# Patient Record
Sex: FEMALE | Race: WHITE | NOT HISPANIC OR LATINO | Employment: FULL TIME | URBAN - METROPOLITAN AREA
[De-identification: names, ages, dates, MRNs, and addresses within clinical notes are randomized per-mention and may not be internally consistent; named-entity substitution may affect disease eponyms.]

---

## 2018-03-12 ENCOUNTER — OFFICE VISIT (OUTPATIENT)
Dept: OBGYN CLINIC | Facility: MEDICAL CENTER | Age: 38
End: 2018-03-12
Payer: COMMERCIAL

## 2018-03-12 VITALS
HEIGHT: 63 IN | SYSTOLIC BLOOD PRESSURE: 122 MMHG | DIASTOLIC BLOOD PRESSURE: 76 MMHG | WEIGHT: 245 LBS | BODY MASS INDEX: 43.41 KG/M2

## 2018-03-12 DIAGNOSIS — N92.0 MENORRHAGIA WITH REGULAR CYCLE: ICD-10-CM

## 2018-03-12 DIAGNOSIS — Z01.419 ENCOUNTER FOR GYNECOLOGICAL EXAMINATION: Primary | ICD-10-CM

## 2018-03-12 PROCEDURE — S0610 ANNUAL GYNECOLOGICAL EXAMINA: HCPCS | Performed by: OBSTETRICS & GYNECOLOGY

## 2018-03-12 RX ORDER — ALBUTEROL SULFATE 0.63 MG/3ML
0.63 SOLUTION RESPIRATORY (INHALATION) 3 TIMES DAILY
COMMUNITY
Start: 2016-04-04

## 2018-03-12 RX ORDER — ACETAMINOPHEN 325 MG/1
TABLET ORAL
COMMUNITY

## 2018-03-12 RX ORDER — BUTALBITAL, ACETAMINOPHEN AND CAFFEINE 300; 40; 50 MG/1; MG/1; MG/1
1 CAPSULE ORAL 3 TIMES DAILY
COMMUNITY
Start: 2018-02-21

## 2018-03-12 RX ORDER — LANOLIN ALCOHOL/MO/W.PET/CERES
3 CREAM (GRAM) TOPICAL
COMMUNITY

## 2018-03-12 RX ORDER — EPINEPHRINE 0.3 MG/.3ML
0.3 INJECTION SUBCUTANEOUS
COMMUNITY

## 2018-03-12 RX ORDER — CYCLOBENZAPRINE HCL 10 MG
TABLET ORAL
Refills: 1 | COMMUNITY
Start: 2018-02-21

## 2018-03-12 RX ORDER — LISINOPRIL 40 MG/1
40 TABLET ORAL
COMMUNITY
Start: 2018-03-12 | End: 2019-03-12

## 2018-03-12 RX ORDER — ALBUTEROL SULFATE 90 UG/1
2 AEROSOL, METERED RESPIRATORY (INHALATION) EVERY 6 HOURS
COMMUNITY
Start: 2016-04-04

## 2018-03-12 RX ORDER — ALPRAZOLAM 0.25 MG/1
0.25 TABLET ORAL 3 TIMES DAILY
COMMUNITY
Start: 2017-11-20

## 2018-03-12 RX ORDER — HYDROCODONE BITARTRATE AND ACETAMINOPHEN 5; 300 MG/1; MG/1
1 TABLET ORAL EVERY 8 HOURS
COMMUNITY
Start: 2018-02-22 | End: 2018-03-24

## 2018-03-12 RX ORDER — OMEPRAZOLE 40 MG/1
CAPSULE, DELAYED RELEASE ORAL
COMMUNITY
Start: 2016-06-20

## 2018-03-12 NOTE — PROGRESS NOTES
ASSESSMENT & PLAN: Venkat Burroughs is a 45 y o  No obstetric history on file  with normal gynecologic exam     1   Routine well woman exam done today  2  Pap and HPV:  The patient's last pap was normal HPV neg  Pap and cotesting was not done today  Current AS[facility]P Guidelines reviewed  3   The following were reviewed in today's visit: breast self exam, exercise and healthy diet  4  Menorrhagia with very regular cycles : we discussed risks and benefits of LARC for management / interested in Mirena     CC:  Annual Gynecologic Examination    HPI: Venkat Burroughs is a 45 y o  No obstetric history on file  who presents for annual gynecologic examination    She has the following concerns:  Heavy regular cycles , has done research on Mirena and desires to have one placed      Health Maintenance:      Past Medical History:   Diagnosis Date    Hip dysplasia     last assessed: 9/15/15    Insulin controlled gestational diabetes mellitus in third trimester     last assessed: 9/23/15       Past Surgical History:   Procedure Laterality Date    CARPAL TUNNEL RELEASE Bilateral     LAPAROSCOPIC CHOLECYSTECTOMY      LAILA-EN-Y PROCEDURE      TOOTH EXTRACTION      TOTAL HIP ARTHROPLASTY Left     TRIGGER FINGER RELEASE Right        Past OB/Gyn History:  OB History     Obstetric Comments    History of insulin controlled Diabetes Mellitus in third trimester          Family History   Problem Relation Age of Onset    Aortic stenosis Mother     Diabetes Mother     Hypertension Mother     Hyperlipidemia Mother     Diabetes Father     Hypertension Father     Hyperlipidemia Father     Heart murmur Brother     Hypertension Brother        Social History:  Social History     Social History    Marital status: /Civil Union     Spouse name: N/A    Number of children: N/A    Years of education: N/A     Occupational History    Unemployed      Social History Main Topics    Smoking status: Never Smoker    Smokeless tobacco: Never Used    Alcohol use No    Drug use: No    Sexual activity: Yes     Partners: Male     Birth control/ protection: Male Sterilization     Other Topics Concern    Not on file     Social History Narrative    No narrative on file         Allergies   Allergen Reactions    Other     Montelukast Anxiety     anxiety  anxiety         Current Outpatient Prescriptions:     albuterol (ACCUNEB) 0 63 MG/3ML nebulizer solution, Inhale 0 63 mg Three times a day, Disp: , Rfl:     albuterol (PROVENTIL HFA,VENTOLIN HFA) 90 mcg/act inhaler, Inhale 2 puffs every 6 (six) hours, Disp: , Rfl:     ALPRAZolam (XANAX) 0 25 mg tablet, Take 0 25 mg by mouth Three times a day, Disp: , Rfl:     Butalbital-APAP-Caffeine -40 MG CAPS, Take 1 capsule by mouth Three times a day, Disp: , Rfl:     HYDROcodone-acetaminophen (XODOL) 5-300 MG per tablet, Take 1 tablet by mouth every 8 (eight) hours, Disp: , Rfl:     ipratropium (ATROVENT) 0 02 % nebulizer solution, , Disp: , Rfl:     lisinopril (ZESTRIL) 40 mg tablet, Take 40 mg by mouth, Disp: , Rfl:     omeprazole (PriLOSEC) 40 MG capsule, TAKE ONE CAPSULE BY MOUTH IN THE MORNING, Disp: , Rfl:     acetaminophen (TYLENOL) 325 mg tablet, Take by mouth, Disp: , Rfl:     Cholecalciferol (VITAMIN D3) 5000 units CHEW, Chew 5,000 Units, Disp: , Rfl:     cyclobenzaprine (FLEXERIL) 10 mg tablet, TAKE 1 TABLET BY MOUTH 3 TIMES A DAY AS NEEDED FOR MUSCLE SPASMS, Disp: , Rfl: 1    diphenhydrAMINE (BENADRYL) 50 MG tablet, Take 50 mg by mouth every 6 (six) hours, Disp: , Rfl:     EPINEPHrine (EPIPEN) 0 3 mg/0 3 mL SOAJ, Inject 0 3 mg into the shoulder, thigh, or buttocks, Disp: , Rfl:     melatonin 3 mg, Take 3 mg by mouth, Disp: , Rfl:     mepolizumab (NUCALA) 100 mg, Inject 100 mg under the skin, Disp: , Rfl:     Mometasone Furo-Formoterol Fum (DULERA) 100-5 MCG/ACT AERO, Inhale, Disp: , Rfl:       Review of Systems  Constitutional :no fever, feels well, no tiredness, no recent weight gain or loss  ENT: no ear ache, no loss of hearing, no nosebleeds or nasal discharge, no sore throat or hoarseness  Cardiovascular: no complaints of slow or fast heart beat, no chest pain, no palpitations, no leg claudication or lower extremity edema  Respiratory: no complaints of shortness of shortness of breath, no JENNINGS  Breasts:no complaints of breast pain, breast lump, or nipple discharge  Gastrointestinal: no complaints of abdominal pain, constipation,nausea, vomiting, or diarrhea or bloody stools  Genitourinary : as noted in HPI  Integumentary: no complaints of skin rash or lesion, itching or dry skin  Neurological: no complaints of headache, no confusion, no numbness or tingling, no dizziness or fainting    Objective      /76   Ht 5' 2 5" (1 588 m)   Wt 111 kg (245 lb)   LMP 02/19/2018   Breastfeeding? No   BMI 44 10 kg/m²   General:   appears stated age, cooperative, alert normal mood and affect   Neck: Neck: normal, supple,trachea midline, no masses   Heart: regular rate and rhythm, S1, S2 normal, no murmur, click, rub or gallop   Lungs: clear to auscultation bilaterally   Breasts: normal appearance, no masses or tenderness, No axillary or supraclavicular adenopathy   Abdomen: soft, non-tender, without masses or organomegaly   Vulva: normal   Vagina: normal vagina, no discharge, exudate, lesion, or erythema   Urethra: normal   Cervix: Normal, no discharge  Nontender     Uterus: normal size, contour, position, consistency, mobility, non-tender   Adnexa: no mass, fullness, tenderness

## 2018-03-14 ENCOUNTER — TELEPHONE (OUTPATIENT)
Dept: OBGYN CLINIC | Facility: MEDICAL CENTER | Age: 38
End: 2018-03-14

## 2018-03-14 NOTE — TELEPHONE ENCOUNTER
----- Message from Cole Sidhu sent at 3/12/2018  4:38 PM EDT -----  Regarding: Chu Cruz  Please precert Mirena for pt and schedule insertion with [address]  Thanks!

## 2018-03-28 ENCOUNTER — TELEPHONE (OUTPATIENT)
Dept: OBGYN CLINIC | Facility: MEDICAL CENTER | Age: 38
End: 2018-03-28

## 2018-03-28 NOTE — TELEPHONE ENCOUNTER
I tried to call pt, but # on file is not in service  Mirena is covered at 100% for pt  Letter sent to pt to contact us  Please schedule IUD insertion when she calls back

## 2018-04-26 ENCOUNTER — OFFICE VISIT (OUTPATIENT)
Dept: OBGYN CLINIC | Facility: MEDICAL CENTER | Age: 38
End: 2018-04-26

## 2018-04-26 VITALS — DIASTOLIC BLOOD PRESSURE: 84 MMHG | BODY MASS INDEX: 44.82 KG/M2 | SYSTOLIC BLOOD PRESSURE: 126 MMHG | WEIGHT: 249 LBS

## 2018-04-26 DIAGNOSIS — Z30.430 ENCOUNTER FOR INSERTION OF INTRAUTERINE CONTRACEPTIVE DEVICE (IUD): Primary | ICD-10-CM

## 2018-04-26 RX ORDER — MISOPROSTOL 200 UG/1
200 TABLET ORAL DAILY
Qty: 2 TABLET | Refills: 0 | Status: SHIPPED | OUTPATIENT
Start: 2018-04-26

## 2018-04-26 NOTE — PROGRESS NOTES
Iud insertions  Date/Time: 4/26/2018 1:05 PM  Performed by: Nick Odom  Authorized by: Nick Odom     Consent:     Consent obtained:  Written    Consent given by:  Patient    Procedure risks and benefits discussed: yes      Patient questions answered: yes      Patient agrees, verbalizes understanding, and wants to proceed: yes      Educational handouts given: yes      Instructions and paperwork completed: yes    Procedure:     Pelvic exam performed: yes      Negative urine pregnancy test: yes      Cervix cleaned and prepped: yes      Speculum placed in vagina: yes      Tenaculum applied to cervix: yes      Uterus sounded: no      Could not pass sound  Cervix dilated  Procedure terminated due to inability to sound uterus  Will have patient follow up for second attempt after taking cytotec  Verbalized understanding

## 2018-05-01 ENCOUNTER — PROCEDURE VISIT (OUTPATIENT)
Dept: OBGYN CLINIC | Facility: MEDICAL CENTER | Age: 38
End: 2018-05-01
Payer: COMMERCIAL

## 2018-05-01 VITALS
SYSTOLIC BLOOD PRESSURE: 144 MMHG | DIASTOLIC BLOOD PRESSURE: 78 MMHG | BODY MASS INDEX: 44.14 KG/M2 | WEIGHT: 245.25 LBS

## 2018-05-01 DIAGNOSIS — Z30.430 ENCOUNTER FOR IUD INSERTION: Primary | ICD-10-CM

## 2018-05-01 PROCEDURE — 58300 INSERT INTRAUTERINE DEVICE: CPT | Performed by: OBSTETRICS & GYNECOLOGY

## 2018-06-01 ENCOUNTER — OFFICE VISIT (OUTPATIENT)
Dept: OBGYN CLINIC | Facility: MEDICAL CENTER | Age: 38
End: 2018-06-01
Payer: COMMERCIAL

## 2018-06-01 VITALS — BODY MASS INDEX: 44.1 KG/M2 | SYSTOLIC BLOOD PRESSURE: 130 MMHG | WEIGHT: 245 LBS | DIASTOLIC BLOOD PRESSURE: 84 MMHG

## 2018-06-01 DIAGNOSIS — Z30.431 IUD CHECK UP: Primary | ICD-10-CM

## 2018-06-01 PROCEDURE — 99213 OFFICE O/P EST LOW 20 MIN: CPT | Performed by: OBSTETRICS & GYNECOLOGY

## 2018-06-01 NOTE — PROGRESS NOTES
Assessment/Plan       Problem List Items Addressed This Visit     None      Visit Diagnoses     IUD check up    -  Primary            1      Subjective   Azam Freitas is an 45 y o  woman who presents for IUD string check  Patient has the following IUD: Mirena  She reports no complaints  Pt has not been able to feel strings  Pt does report bleeding issues  Pt does not report pain issues  There is no problem list on file for this patient  Menstrual History:  OB History      Para Term  AB Living    2 2            SAB TAB Ectopic Multiple Live Births                     Obstetric Comments    History of insulin controlled Diabetes Mellitus in third trimester       No LMP recorded           Past Medical History:   Diagnosis Date    Hip dysplasia     last assessed: 9/15/15    Insulin controlled gestational diabetes mellitus in third trimester     last assessed: 9/23/15         Current Outpatient Prescriptions:     acetaminophen (TYLENOL) 325 mg tablet, Take by mouth, Disp: , Rfl:     albuterol (ACCUNEB) 0 63 MG/3ML nebulizer solution, Inhale 0 63 mg Three times a day, Disp: , Rfl:     albuterol (PROVENTIL HFA,VENTOLIN HFA) 90 mcg/act inhaler, Inhale 2 puffs every 6 (six) hours, Disp: , Rfl:     ALPRAZolam (XANAX) 0 25 mg tablet, Take 0 25 mg by mouth Three times a day, Disp: , Rfl:     Butalbital-APAP-Caffeine -40 MG CAPS, Take 1 capsule by mouth Three times a day, Disp: , Rfl:     Cholecalciferol (VITAMIN D3) 5000 units CHEW, Chew 5,000 Units, Disp: , Rfl:     cyclobenzaprine (FLEXERIL) 10 mg tablet, TAKE 1 TABLET BY MOUTH 3 TIMES A DAY AS NEEDED FOR MUSCLE SPASMS, Disp: , Rfl: 1    diphenhydrAMINE (BENADRYL) 50 MG tablet, Take 50 mg by mouth every 6 (six) hours, Disp: , Rfl:     EPINEPHrine (EPIPEN) 0 3 mg/0 3 mL SOAJ, Inject 0 3 mg into the shoulder, thigh, or buttocks, Disp: , Rfl:     ipratropium (ATROVENT) 0 02 % nebulizer solution, , Disp: , Rfl:     lisinopril (ZESTRIL) 40 mg tablet, Take 40 mg by mouth, Disp: , Rfl:     melatonin 3 mg, Take 3 mg by mouth, Disp: , Rfl:     mepolizumab (NUCALA) 100 mg, Inject 100 mg under the skin, Disp: , Rfl:     misoprostol (CYTOTEC) 200 mcg tablet, Take 1 tablet (200 mcg total) by mouth daily Take one tab the night before and the morning of procedure, Disp: 2 tablet, Rfl: 0    Mometasone Furo-Formoterol Fum (DULERA) 100-5 MCG/ACT AERO, Inhale, Disp: , Rfl:     omeprazole (PriLOSEC) 40 MG capsule, TAKE ONE CAPSULE BY MOUTH IN THE MORNING, Disp: , Rfl:     Current Facility-Administered Medications:     levonorgestrel (MIRENA) 20 MCG/24HR IUD, , Intrauterine, Once, Deneen Green MD    Allergies   Allergen Reactions    Other     Montelukast Anxiety     anxiety  anxiety       Review of Systems  Constitutional :no fever, feels well, no tiredness, no recent weight gain or loss  ENT: no ear ache, no loss of hearing, no nosebleeds or nasal discharge, no sore throat or hoarseness  Cardiovascular: no complaints of slow or fast heart beat, no chest pain, no palpitations, no leg claudication or lower extremity edema  Respiratory: no complaints of shortness of shortness of breath, no JENNINGS  Breasts:no complaints of breast pain, breast lump, or nipple discharge  Gastrointestinal: no complaints of abdominal pain, constipation, nausea, vomiting, or diarrhea or bloody stools  Genitourinary : no complaints of dysuria, incontinence, pelvic pain, no dysmenorrhea, vaginal discharge or abnormal vaginal bleeding and as noted in HPI    Integumentary: no complaints of skin rash or lesion, itching or dry skin  Neurological: no complaints of headache, no confusion, no numbness or tingling, no dizziness or fainting    Objective    /84   Wt 111 kg (245 lb)   BMI 44 10 kg/m²     General:   alert and oriented, in no acute distress   Psychiatric orientation to person, place and time: normal   Mood and affect: normal   Vulva: normal, Bartholin's, Urethra, [place]'s normal   Vagina: normal mucosa   Cervix: IUD string visualized   Uterus: normal size   Adnexa: no mass, fullness, tenderness

## 2018-07-09 NOTE — PROGRESS NOTES
Iud insertions  Date/Time: 5/1/2018 12:17 PM  Performed by: Cecelia Jesus  Authorized by: Cecelia Jesus     Consent:     Consent obtained:  Written    Consent given by:  Patient    Procedure risks and benefits discussed: yes      Patient questions answered: yes      Patient agrees, verbalizes understanding, and wants to proceed: yes      Educational handouts given: yes      Instructions and paperwork completed: yes    Procedure:     Negative urine pregnancy test: yes      Cervix cleaned and prepped: yes      Speculum placed in vagina: yes      Tenaculum applied to cervix: yes      Uterus sounded: yes      IUD inserted with no complications: yes      IUD type:  Mirena    Strings trimmed: yes (4 cm)      Uterus sound depth (cm):  8  Post-procedure:     Patient tolerated procedure well: yes      Patient will follow up after next period: 1 month  This was her second attempt at insertion  Patient was premedicated with cytotec  Patient sleeping s/p morphine administration. Will continue to monitor. Patient still appears to be restless and in pain following toradol administration, 2.9mg of morphine administered, will monitor for improvement, continuous pulse ox on pt. to monitor for desat. Mother present at bedside with no complaints or questions at this time. VSS, will continue to monitor. pt. has been given a dose of oxycodone, will reassess pt.'s pain at 0700, if at an acceptable level pt. will be discharged home on pain meds. Pt. is currently well appearing in 5/10 pain, VSS, will continue to monitor.

## 2021-04-06 DIAGNOSIS — Z23 ENCOUNTER FOR IMMUNIZATION: ICD-10-CM

## 2021-06-28 ENCOUNTER — TELEPHONE (OUTPATIENT)
Dept: OBGYN CLINIC | Facility: MEDICAL CENTER | Age: 41
End: 2021-06-28

## 2021-06-28 NOTE — TELEPHONE ENCOUNTER
Pt lvm to reschedule upcoming appt, lvm in return for pt to contact office if she would like to do so

## 2021-10-22 ENCOUNTER — TELEPHONE (OUTPATIENT)
Dept: OBGYN CLINIC | Facility: MEDICAL CENTER | Age: 41
End: 2021-10-22

## 2024-01-10 ENCOUNTER — TELEPHONE (OUTPATIENT)
Dept: PSYCHIATRY | Facility: CLINIC | Age: 44
End: 2024-01-10

## 2024-01-10 NOTE — TELEPHONE ENCOUNTER
Patient has been added to the [facility]ication Management and Talk Therapy wait list without a referral.    Insurance: [employer]  Insurance Type:    Commercial [x]   Medicaid []   [facility] (if applicable)   Medicare []  Location Preference: Anywhere but [place]  Provider Preference: none  Virtual: Yes [x] No []  Were outside resources sent: Yes [] No [x]  Advised the patient to contact her PCP for a referral.

## 2024-01-18 ENCOUNTER — TELEPHONE (OUTPATIENT)
Dept: OBGYN CLINIC | Facility: MEDICAL CENTER | Age: 44
End: 2024-01-18

## 2024-01-18 NOTE — TELEPHONE ENCOUNTER
Patient called into office in regards to her upcoming IUD removal appointment. Patient stated that when she came in to have the IUD placed, she had to come back and was given a medication to soften her cervix. Patient is wondering if this is something that could be prescribed again so that there is no difficulty with her removal. Patient would like a call to discuss if possible, please review, thank you!

## 2024-01-19 ENCOUNTER — TELEPHONE (OUTPATIENT)
Dept: PSYCHIATRY | Facility: CLINIC | Age: 44
End: 2024-01-19

## 2024-01-22 ENCOUNTER — OFFICE VISIT (OUTPATIENT)
Dept: OBGYN CLINIC | Facility: MEDICAL CENTER | Age: 44
End: 2024-01-22
Payer: COMMERCIAL

## 2024-01-22 VITALS
DIASTOLIC BLOOD PRESSURE: 78 MMHG | HEIGHT: 62 IN | WEIGHT: 190 LBS | SYSTOLIC BLOOD PRESSURE: 124 MMHG | BODY MASS INDEX: 34.96 KG/M2

## 2024-01-22 DIAGNOSIS — Z80.41 FAMILY HISTORY OF OVARIAN CANCER: Primary | ICD-10-CM

## 2024-01-22 DIAGNOSIS — Z12.4 SCREENING FOR CERVICAL CANCER: ICD-10-CM

## 2024-01-22 PROCEDURE — G0476 HPV COMBO ASSAY CA SCREEN: HCPCS | Performed by: OBSTETRICS & GYNECOLOGY

## 2024-01-22 PROCEDURE — G0145 SCR C/V CYTO,THINLAYER,RESCR: HCPCS | Performed by: OBSTETRICS & GYNECOLOGY

## 2024-01-22 PROCEDURE — S0610 ANNUAL GYNECOLOGICAL EXAMINA: HCPCS | Performed by: OBSTETRICS & GYNECOLOGY

## 2024-01-22 NOTE — PROGRESS NOTES
A/P    1.  Annual exam    Last PAP - 6/4/2015- neg neg   Next due today with co testing    Scheduling of pap discussed in detail      Mammogram - last 8/1/22- # 1   Next do slipped    Self breast exams discussed    2.  Mrienia -    Placed 5/1/2018   Reports - that she is going through menopause   She has hot flashes.      Pt reports that she is getting long cycles      Removed with out difficulty    Will come back and consider LTH if not better.       43 y.o.,No LMP recorded.  C/O as detailed above      Past medical / social / surgical / family history reviewed and updated   Medication and allergies discussed in detail and updated     Review of Systems - History obtained from chart review and the patient  General ROS: negative  Psychological ROS: negative  Ophthalmic ROS: negative  ENT ROS: negative  Allergy and Immunology ROS: negative  Hematological and Lymphatic ROS: negative  Endocrine ROS: negative  Breast ROS: negative for breast lumps  Respiratory ROS: no cough, shortness of breath, or wheezing  Cardiovascular ROS: no chest pain or dyspnea on exertion  Gastrointestinal ROS: no abdominal pain, change in bowel habits, or black or bloody stools  Genito-Urinary ROS: no dysuria, trouble voiding, or hematuria  Hot flashes and irregular bleeding   Musculoskeletal ROS: negative  Neurological ROS: no TIA or stroke symptoms  Dermatological ROS: negative        Physical Exam  Vitals reviewed. Exam conducted with a chaperone present.   Constitutional:       Appearance: She is well-developed.   Neck:      Thyroid: No thyromegaly.   Cardiovascular:      Rate and Rhythm: Normal rate.      Heart sounds: Normal heart sounds.   Pulmonary:      Effort: Pulmonary effort is normal. No accessory muscle usage or respiratory distress.      Breath sounds: Normal air entry.   Chest:      Chest wall: No tenderness.   Breasts:     Breasts are symmetrical.      Right: No inverted nipple, mass or tenderness.      Left: No inverted nipple,  Spoke to [facility], Told them that our physicians are very aware of the benefits of statin thereap, however, this pt has not been in since 1/2019 and has not seen Dr Danielle in 1year.   mass or tenderness.   Abdominal:      General: There is no distension.      Palpations: Abdomen is soft.      Tenderness: There is no abdominal tenderness. There is no right CVA tenderness, left CVA tenderness, guarding or rebound.      Hernia: There is no hernia in the left inguinal area.   Genitourinary:     General: Normal vulva.      Exam position: Lithotomy position.      Labia:         Right: No rash, tenderness, lesion or injury.         Left: No rash, tenderness, lesion or injury.       Vagina: No foreign body. No vaginal discharge or bleeding.      Cervix: No cervical motion tenderness or discharge.      Uterus: Normal. Not enlarged and not fixed.       Adnexa: Right adnexa normal and left adnexa normal.        Right: No mass, tenderness or fullness.          Left: No mass, tenderness or fullness.        Rectum: No external hemorrhoid.   Musculoskeletal:      Cervical back: Normal range of motion.   Lymphadenopathy:      Cervical: No cervical adenopathy.      Upper Body:      Right upper body: No supraclavicular adenopathy.      Left upper body: No supraclavicular adenopathy.      Lower Body: No right inguinal adenopathy. No left inguinal adenopathy.   Neurological:      Mental Status: She is alert and oriented to person, place, and time.   Psychiatric:         Speech: Speech normal.         Behavior: Behavior normal.         Thought Content: Thought content normal.         Judgment: Judgment normal.

## 2024-01-23 ENCOUNTER — TELEPHONE (OUTPATIENT)
Dept: HEMATOLOGY ONCOLOGY | Facility: CLINIC | Age: 44
End: 2024-01-23

## 2024-01-23 LAB
HPV HR 12 DNA CVX QL NAA+PROBE: NEGATIVE
HPV16 DNA CVX QL NAA+PROBE: NEGATIVE
HPV18 DNA CVX QL NAA+PROBE: NEGATIVE

## 2024-01-23 NOTE — TELEPHONE ENCOUNTER
I spoke with Lorna to schedule their consultation with Cancer Risk and Genetics.     Scheduling Outcome: Patient is scheduled for an appointment on 7/25/24 at 2pm with Vaishali Calzada    Personal/Family History Related to Appointment:     Personal History of Cancer: Patient reports no personal history of cancer    Family History of Cancer: Patient reports family history of (mgm)-ovarian cancer-age mid 60's; (ma) breast cancer; (pgf) brain cancer    Is patient of Ashkenazi Bahai Heritage?: No    History of Genetic Testing:  Personal History of Genetic Testing: reports no personal history of Genetic Testing     Family History of Genetic Testing: Patient reports that no family members have had Genetic Testing.     Progeny:  Is patient able to complete our family history questionnaire on a computer?:  Yes    Patient's preferred e-mail address: [email or web address]

## 2024-01-25 LAB
LAB AP GYN PRIMARY INTERPRETATION: NORMAL
Lab: NORMAL
PATH INTERP SPEC-IMP: NORMAL

## 2024-04-10 ENCOUNTER — HOSPITAL ENCOUNTER (EMERGENCY)
Facility: HOSPITAL | Age: 44
Discharge: HOME | End: 2024-04-10
Attending: EMERGENCY MEDICINE
Payer: COMMERCIAL

## 2024-04-10 VITALS
SYSTOLIC BLOOD PRESSURE: 157 MMHG | RESPIRATION RATE: 18 BRPM | OXYGEN SATURATION: 98 % | HEART RATE: 121 BPM | DIASTOLIC BLOOD PRESSURE: 98 MMHG | TEMPERATURE: 98.1 F

## 2024-04-10 DIAGNOSIS — F41.9 ANXIETY: ICD-10-CM

## 2024-04-10 DIAGNOSIS — R45.851 SUICIDAL IDEATION: Primary | ICD-10-CM

## 2024-04-10 DIAGNOSIS — F32.A DEPRESSION: ICD-10-CM

## 2024-04-10 LAB
AMPHETAMINES SERPL QL SCN: NEGATIVE
BARBITURATES UR QL: POSITIVE
BENZODIAZ UR QL: NEGATIVE
COCAINE UR QL: NEGATIVE
ETHANOL EXG-MCNC: 0.06 MG/DL
EXT PREGNANCY TEST URINE: NEGATIVE
EXT. CONTROL: NORMAL
FENTANYL UR QL SCN: NEGATIVE
HYDROCODONE UR QL SCN: NEGATIVE
METHADONE UR QL: NEGATIVE
OPIATES UR QL SCN: NEGATIVE
OXYCODONE+OXYMORPHONE UR QL SCN: NEGATIVE
PCP UR QL: NEGATIVE
THC UR QL: POSITIVE

## 2024-04-10 PROCEDURE — 81025 URINE PREGNANCY TEST: CPT

## 2024-04-10 PROCEDURE — 80307 DRUG TEST PRSMV CHEM ANLYZR: CPT

## 2024-04-10 PROCEDURE — 99244 OFF/OP CNSLTJ NEW/EST MOD 40: CPT | Performed by: GENERAL PRACTICE

## 2024-04-10 PROCEDURE — 82075 ASSAY OF BREATH ETHANOL: CPT

## 2024-04-10 RX ORDER — CLONAZEPAM 0.5 MG/1
1 TABLET ORAL DAILY
Qty: 14 TABLET | Refills: 0 | Status: SHIPPED | OUTPATIENT
Start: 2024-04-10 | End: 2024-04-17

## 2024-04-10 NOTE — ED PROVIDER NOTES
"History  Chief Complaint   Patient presents with    Psychiatric Evaluation     Pt came in via [employer]. Per [employer], pt locked herself in the basement and wrapped a cord around her neck to end her life. Pt reports feeling depressed and hopeless for a long time and recent events led her to \"snap\". Pt reports cutting herself with a box knife on her arm. Pt reports she wants help because she wants to be there for her daughters and her . She does not feel she is getting appropriate care from current psychiatrist.     44 YOF with PMH gastric bypass, hip replacement, anxiety, depression presents today for psych eval. [employer] brought her in due to  calling 911. Pt reports things have been very overwhelming in her life and she is at her \"breaking point\". Reports today she used a  to cut her left arm and she wrapped a robe sash around her neck and tightened it slightly. Pt reports she denies going any further because she would \"never leave her kids\". Pt struggles with feeling like she is not enough for her family. Pt states she has been on various medications over the past 20 years which have not helped at all. Patient reports she was started on Strattera in December, and does not feel as if it works, but when she told her doctor through [facility], nothing was changed. Pt denies suicidal ideations, but states she often feels that it is best for her family if they were not around.  Patient vehemently denies intent to end her life. Patient denies any previous suicide attempts. Patient denies homicidal ideation and psychosis. Patient denies drug and tobacco consumption, reports social alcohol consumption. Patient reports she does not eat much as she had gastric bypass in the past, reports no major disruptions in sleep patterns.          Prior to Admission Medications   Prescriptions Last Dose Informant Patient Reported? Taking?   Butalbital-APAP-Caffeine -40 MG CAPS   Yes No   Sig: Take 1 capsule by mouth Three " times a day   Cholecalciferol (VITAMIN D3) 5000 units CHEW   Yes No   Sig: Chew 5,000 Units   EPINEPHrine (EPIPEN) 0.3 mg/0.3 mL SOAJ   Yes No   Sig: Inject 0.3 mg into the shoulder, thigh, or buttocks   Mometasone Furo-Formoterol Fum (DULERA) 100-5 MCG/ACT AERO   Yes No   Sig: Inhale   acetaminophen (TYLENOL) 325 mg tablet   Yes No   Sig: Take by mouth   albuterol (ACCUNEB) 0.63 MG/3ML nebulizer solution   Yes No   Sig: Inhale 0.63 mg Three times a day   albuterol (PROVENTIL HFA,VENTOLIN HFA) 90 mcg/act inhaler   Yes No   Sig: Inhale 2 puffs every 6 (six) hours   cyclobenzaprine (FLEXERIL) 10 mg tablet   Yes No   Sig: TAKE 1 TABLET BY MOUTH 3 TIMES A DAY AS NEEDED FOR MUSCLE SPASMS   diphenhydrAMINE (BENADRYL) 50 MG tablet   Yes No   Sig: Take 50 mg by mouth every 6 (six) hours   ipratropium (ATROVENT) 0.02 % nebulizer solution   Yes No   lisinopril (ZESTRIL) 40 mg tablet   Yes No   Sig: Take 40 mg by mouth   melatonin 3 mg   Yes No   Sig: Take 3 mg by mouth   mepolizumab (NUCALA) 100 mg   Yes No   Sig: Inject 100 mg under the skin   misoprostol (CYTOTEC) 200 mcg tablet   No No   Sig: Take 1 tablet (200 mcg total) by mouth daily Take one tab the night before and the morning of procedure   omeprazole (PriLOSEC) 40 MG capsule   Yes No   Sig: TAKE ONE CAPSULE BY MOUTH IN THE MORNING      Facility-Administered Medications Last Administration Doses Remaining   levonorgestrel (MIRENA) 20 MCG/24HR IUD None recorded 1          Past Medical History:   Diagnosis Date    Hip dysplasia     last assessed: 9/15/15    Insulin controlled gestational diabetes mellitus in third trimester     last assessed: 9/23/15       Past Surgical History:   Procedure Laterality Date    CARPAL TUNNEL RELEASE Bilateral     LAPAROSCOPIC CHOLECYSTECTOMY      LAILA-EN-Y PROCEDURE      TOOTH EXTRACTION      TOTAL HIP ARTHROPLASTY Left     TRIGGER FINGER RELEASE Right        Family History   Problem Relation Age of Onset    Aortic stenosis Mother      Diabetes Mother     Hypertension Mother     Hyperlipidemia Mother     Diabetes Father     Hypertension Father     Hyperlipidemia Father     Heart murmur Brother     Hypertension Brother      I have reviewed and agree with the history as documented.    E-Cigarette/Vaping     E-Cigarette/Vaping Substances     Social History     Tobacco Use    Smoking status: Never    Smokeless tobacco: Never   Substance Use Topics    Alcohol use: No    Drug use: No       Review of Systems   Constitutional:  Negative for chills and fever.   Psychiatric/Behavioral:  Positive for self-injury. Negative for hallucinations, sleep disturbance and suicidal ideas. The patient is nervous/anxious. The patient is not hyperactive.    All other systems reviewed and are negative.      Physical Exam  Physical Exam  Vitals and nursing note reviewed.   Constitutional:       General: She is not in acute distress.     Appearance: Normal appearance. She is well-developed. She is not ill-appearing.   HENT:      Head: Normocephalic and atraumatic.   Eyes:      Conjunctiva/sclera: Conjunctivae normal.   Cardiovascular:      Rate and Rhythm: Normal rate.   Pulmonary:      Effort: Pulmonary effort is normal.   Musculoskeletal:         General: Normal range of motion.      Cervical back: Normal range of motion and neck supple.   Skin:     General: Skin is warm and dry.   Neurological:      Mental Status: She is alert.   Psychiatric:         Attention and Perception: Attention and perception normal.         Mood and Affect: Mood is depressed. Affect is tearful.         Speech: Speech normal.         Behavior: Behavior normal.         Vital Signs  ED Triage Vitals   Temperature Pulse Respirations Blood Pressure SpO2   04/10/24 1854 04/10/24 1809 04/10/24 1809 04/10/24 1809 04/10/24 1809   98.1 °F (36.7 °C) (!) 121 18 157/98 98 %      Temp Source Heart Rate Source Patient Position - Orthostatic VS BP Location FiO2 (%)   04/10/24 1854 04/10/24 1809 04/10/24 1809  04/10/24 1809 --   Oral Monitor Lying Right arm       Pain Score       --                  Vitals:    04/10/24 1809   BP: 157/98   Pulse: (!) 121   Patient Position - Orthostatic VS: Lying         Visual Acuity      ED Medications  Medications - No data to display    Diagnostic Studies  Results Reviewed       Procedure Component Value Units Date/Time    POCT pregnancy, urine [[id number]]  (Normal) Resulted: 04/10/24 2005    Lab Status: Final result Updated: 04/10/24 2005     EXT Preg Test, Ur Negative     Control Valid    Rapid drug screen, urine [[id number]]  (Abnormal) Collected: 04/10/24 1918    Lab Status: Final result Specimen: Urine, Clean Catch Updated: 04/10/24 1952     Amph/Meth UR Negative     Barbiturate Ur Positive     Benzodiazepine Urine Negative     Cocaine Urine Negative     Methadone Urine Negative     Opiate Urine Negative     PCP Ur Negative     THC Urine Positive     Oxycodone Urine Negative     Fentanyl Urine Negative     HYDROCODONE URINE Negative    Narrative:      Presumptive report. If requested, specimen will be sent to reference lab for confirmation.  FOR MEDICAL PURPOSES ONLY.   IF CONFIRMATION NEEDED PLEASE CONTACT THE LAB WITHIN 5 DAYS.    Drug Screen Cutoff Levels:  AMPHETAMINE/METHAMPHETAMINES  1000 ng/mL  BARBITURATES     200 ng/mL  BENZODIAZEPINES     200 ng/mL  COCAINE      300 ng/mL  METHADONE      300 ng/mL  OPIATES      300 ng/mL  PHENCYCLIDINE     25 ng/mL  THC       50 ng/mL  OXYCODONE      100 ng/mL  FENTANYL      5 ng/mL  HYDROCODONE     300 ng/mL    POCT alcohol breath test [[id number]]  (Normal) Resulted: 04/10/24 1920    Lab Status: Final result Updated: 04/10/24 1920     EXTBreath Alcohol 0.057                   No orders to display              Procedures  Procedures         ED Course  ED Course as of 04/10/24 2050   Wed Apr 10, 2024   2019 Dr. Dial does not recommend admission at this time. Could do Clonazepam 1 mg for 7 days                               SBIRT 20yo+       Flowsheet Row Most Recent Value   Initial Alcohol Screen: US AUDIT-C     1. How often do you have a drink containing alcohol? 0 Filed at: 04/10/2024 1853   2. How many drinks containing alcohol do you have on a typical day you are drinking?  0 Filed at: 04/10/2024 1853   3b. FEMALE Any Age, or MALE 65+: How often do you have 4 or more drinks on one occassion? 0 Filed at: 04/10/2024 1853   Audit-C Score 0 Filed at: 04/10/2024 1853   [place]: How many times in the past year have you...    Used an illegal drug or used a prescription medication for non-medical reasons? Never Filed at: 04/10/2024 1853                      Medical Decision Making  Psych consult placed- did not recommend inpatient at this time. Recommended Klonopin- I prescribed 7 days worth.     I have discussed the plan to discharge pt from ED. The patient was discharged in stable condition.  Patient ambulated off the department.  Extensive return to emergency department precautions were discussed.  Follow up with appropriate providers including primary care physician was discussed.  Patient and/or their  primary decision maker expressed understanding.  Patient remained stable during entire emergency department stay.      Amount and/or Complexity of Data Reviewed  Labs: ordered.    Risk  Prescription drug management.             Disposition  Final diagnoses:   Suicidal ideation   Depression   Anxiety     Time reflects when diagnosis was documented in both MDM as applicable and the Disposition within this note       Time User Action Codes Description Comment    4/10/2024  6:47 PM Veronique Davis Add [R45.851] Suicidal ideation     4/10/2024  8:19 PM Veronique Davis Add [F32.A] Depression     4/10/2024  8:19 PM Veronique Davis Add [F41.9] Anxiety           ED Disposition       ED Disposition   Discharge    Condition   Stable    Date/Time   Wed Apr 10, 2024 2019    Comment   Lorna Antunez discharge to home/self care.                   MD Documentation       Flowsheet Row Most Recent Value   Sending MD Dr. Windy Xavier          Follow-up Information       Follow up With Specialties Details Why Contact Info Additional Information    [facility] Schedule an appointment as soon as possible for a visit   [address] [facility] [address] [phone] [facility], [address], [place], [place], [address] [phone]            Discharge Medication List as of 4/10/2024  8:33 PM        START taking these medications    Details   clonazePAM (KlonoPIN) 0.5 mg tablet Take 2 tablets (1 mg total) by mouth daily for 7 days, Starting Wed 4/10/2024, Until Wed 4/17/2024, Normal           CONTINUE these medications which have NOT CHANGED    Details   acetaminophen (TYLENOL) 325 mg tablet Take by mouth, Historical Med      albuterol (ACCUNEB) 0.63 MG/3ML nebulizer solution Inhale 0.63 mg Three times a day, Starting Mon 4/4/2016, Historical Med      albuterol (PROVENTIL HFA,VENTOLIN HFA) 90 mcg/act inhaler Inhale 2 puffs every 6 (six) hours, Starting Mon 4/4/2016, Historical Med      Butalbital-APAP-Caffeine -40 MG CAPS Take 1 capsule by mouth Three times a day, Starting Wed 2/21/2018, Historical Med      Cholecalciferol (VITAMIN D3) 5000 units CHEW Chew 5,000 Units, Historical Med      cyclobenzaprine (FLEXERIL) 10 mg tablet TAKE 1 TABLET BY MOUTH 3 TIMES A DAY AS NEEDED FOR MUSCLE SPASMS, Historical Med      diphenhydrAMINE (BENADRYL) 50 MG tablet Take 50 mg by mouth every 6 (six) hours, Historical Med      EPINEPHrine (EPIPEN) 0.3 mg/0.3 mL SOAJ Inject 0.3 mg into the shoulder, thigh, or buttocks, Historical Med      ipratropium (ATROVENT) 0.02 % nebulizer solution Starting Tue 10/17/2017, Historical Med      lisinopril (ZESTRIL) 40 mg tablet Take 40 mg by mouth, Starting Mon 3/12/2018, Until Tue 3/12/2019, Historical Med      melatonin 3 mg Take 3 mg by mouth,  Historical Med      mepolizumab (NUCALA) 100 mg Inject 100 mg under the skin, Historical Med      misoprostol (CYTOTEC) 200 mcg tablet Take 1 tablet (200 mcg total) by mouth daily Take one tab the night before and the morning of procedure, Starting Thu 4/26/2018, Normal      Mometasone Furo-Formoterol Fum (DULERA) 100-5 MCG/ACT AERO Inhale, Historical Med      omeprazole (PriLOSEC) 40 MG capsule TAKE ONE CAPSULE BY MOUTH IN THE MORNING, Historical Med                 PDMP Review       None            ED Provider  Electronically Signed by             Veronique Davis PA-C  04/10/24 2050

## 2024-04-10 NOTE — ED NOTES
Patient presents to the Emergency Department via [facility] Department after her  called them to bring her to the hospital. Patient is calm and cooperative upon approach, and agrees to speak with this writer. Patient is visibly upset, oriented across all spheres, has a dysphoric affect, speaks logically and coherently but is tearful, and is in a depressed mood. Patient reports that today things became overwhelming and she used a  to cut her left arm, and she states she wrapped a robe sash around her neck. Patient reports she did not intend on going through with ending her life, but feels hopeless and depressed. Patient struggles with feeling like she is not enough for her children and her . Patient states she has been on various medications over the past twenty or so years, but lately they do not seem to be effective. Patient reports she was started on Strattera in December, and does not feel as if it works, but when she told her doctor through [facility], nothing was changed. Patient states she feels unheard by the doctors she has been working with, for both physical maladies and mental health concerns. Patient states she has been trying to switch providers but no one has been calling her back. Patient reports a history of trauma dating back to her early childhood, as well as feelings of abandonment and familial loss. Patient states her  is the only one working in the family due to patient's medical concerns. 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 Patient denies suicidal ideation, but states she often feels as if it would be best for her children and  if she were not around. Patient vehemently denies intent to end her life. Patient denies any previous suicide attempts. Patient denies homicidal ideation and psychosis. Patient denies drug and tobacco consumption, reports social alcohol consumption.  Patient reports she does not eat much as she had gastric bypass in the past, reports no major disruptions in sleep patterns.    Patient admits she needs help, but does not want to sign herself in for inpatient treatment at this time. 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 Patient reports daughters have a large dance competition this weekend that she wishes to be in attendance for. Patient is amenable to a psychiatry consult at this time.    Schuyler Josue  Crisis Intervention Specialist II  04/10/24
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
## 2024-04-10 NOTE — ED NOTES
Psychiatric consult placed through [facility] at this time.     Criselda Mccollum RN  04/10/24 [id number]

## 2024-04-11 ENCOUNTER — TELEPHONE (OUTPATIENT)
Dept: PSYCHIATRY | Facility: CLINIC | Age: 44
End: 2024-04-11

## 2024-04-11 NOTE — ED NOTES
Outpatient resource treatment list provided to patient.    List reviewed and recommendations made.    Patient verbalizes that she will return to the ED if things get worse, or are not getting any better after following up with outpatient treatment.    Schuyler Josue  Crisis Intervention Specialist II  04/10/24
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
## 2024-04-11 NOTE — DISCHARGE INSTRUCTIONS
This writer discussed the patients current presentation and recommended discharge plan with DOV Davis.  They agree with the patient being discharged at this time with referrals and/or information about outpatient treatment resources.     The patient was Instructed to follow up with their PCP, as well as with the information provided by Dr. Dial.     This writer and the patient completed a safety plan.  The patient was provided with a copy of their safety plan with encouragement to utilize the plan following discharge.     In addition, the patient was instructed to call local [facility] crisis, other crisis services, 911 or to go to the nearest ER immediately if their situation changes at any time.     This writer discussed discharge plans with the patient, who agrees with and understands the discharge plans.         SAFETY PLAN  Warning Signs (thoughts, images, mood, behavior, situations) of a potential crisis: Any thoughts of self harm or suicide, increased thoughts of worthlessness or hopelessness      Coping Skills (what can I do to take my mind off the problem, or to keep myself safe): Spend time with your daughters and your , any preferred activities      Outside Support (who can I reach out to for support and help): ; Please follow up with the outpatient therapists from the resource list provided        National Suicide Prevention Hotline:  988      [facility] [phone] - Crisis   [facility] [phone] - LVF Crisis/Mobile Crisis   [phone] - [facility] Crisis   [facility]: [phone]  [facility]: [phone]   [facility] [phone] - Crisis   [facility] [phone] - Crisis     [facility] [phone] - Crisis   [facility] [phone] - Crisis   [phone] - Peer Support Talk Line (1-9pm daily)  [phone] - Teen Support Talk Line (1-9pm daily)  [phone] - [facility]   [facility] [phone]- Crisis    [facility] [phone] - Crisis   [facility] [phone] - Crisis    [facility] [phone] - [facility]

## 2024-05-14 DIAGNOSIS — Z00.6 ENCOUNTER FOR EXAMINATION FOR NORMAL COMPARISON OR CONTROL IN CLINICAL RESEARCH PROGRAM: ICD-10-CM

## 2024-05-21 ENCOUNTER — OFFICE VISIT (OUTPATIENT)
Dept: PSYCHIATRY | Facility: CLINIC | Age: 44
End: 2024-05-21
Payer: COMMERCIAL

## 2024-05-21 VITALS — WEIGHT: 175 LBS | BODY MASS INDEX: 32.01 KG/M2

## 2024-05-21 DIAGNOSIS — R45.851 SUICIDAL IDEATION: ICD-10-CM

## 2024-05-21 DIAGNOSIS — F90.1 ADHD, PREDOMINANTLY HYPERACTIVE TYPE: ICD-10-CM

## 2024-05-21 DIAGNOSIS — F31.62 BIPOLAR DISORDER, CURRENT EPISODE MIXED, MODERATE (HCC): Primary | ICD-10-CM

## 2024-05-21 DIAGNOSIS — F41.9 ANXIETY: ICD-10-CM

## 2024-05-21 DIAGNOSIS — F32.A DEPRESSION: ICD-10-CM

## 2024-05-21 DIAGNOSIS — F41.1 GAD (GENERALIZED ANXIETY DISORDER): ICD-10-CM

## 2024-05-21 DIAGNOSIS — Z79.899 LONG TERM CURRENT USE OF ANTIPSYCHOTIC MEDICATION: ICD-10-CM

## 2024-05-21 PROBLEM — M17.9 OSTEOARTHRITIS OF KNEE: Status: ACTIVE | Noted: 2018-11-05

## 2024-05-21 PROBLEM — M19.90 ARTHRITIS: Status: ACTIVE | Noted: 2024-05-21

## 2024-05-21 PROBLEM — F31.9 BIPOLAR AFFECTIVE DISORDER, CURRENTLY ACTIVE (HCC): Status: ACTIVE | Noted: 2024-05-21

## 2024-05-21 PROBLEM — M19.90 OSTEOARTHRITIS: Status: ACTIVE | Noted: 2024-05-21

## 2024-05-21 PROBLEM — D50.9 IRON DEFICIENCY ANEMIA: Status: ACTIVE | Noted: 2023-03-13

## 2024-05-21 PROBLEM — M25.561 CHRONIC PAIN OF BOTH KNEES: Status: ACTIVE | Noted: 2024-05-21

## 2024-05-21 PROBLEM — E53.8 LOW SERUM VITAMIN B12: Status: ACTIVE | Noted: 2019-09-23

## 2024-05-21 PROBLEM — M25.562 CHRONIC PAIN OF BOTH KNEES: Status: ACTIVE | Noted: 2024-05-21

## 2024-05-21 PROBLEM — G89.29 CHRONIC PAIN OF BOTH KNEES: Status: ACTIVE | Noted: 2024-05-21

## 2024-05-21 PROBLEM — F31.70 BIPOLAR AFFECTIVE DISORDER IN REMISSION (HCC): Status: ACTIVE | Noted: 2024-05-21

## 2024-05-21 PROBLEM — G43.009 MIGRAINE WITHOUT AURA AND WITHOUT STATUS MIGRAINOSUS, NOT INTRACTABLE: Status: ACTIVE | Noted: 2017-09-11

## 2024-05-21 PROBLEM — R79.89 LOW VITAMIN D LEVEL: Status: ACTIVE | Noted: 2019-09-23

## 2024-05-21 PROBLEM — J45.41 MODERATE PERSISTENT ASTHMA WITH ACUTE EXACERBATION: Status: ACTIVE | Noted: 2022-08-31

## 2024-05-21 PROBLEM — I10 ESSENTIAL HYPERTENSION: Status: ACTIVE | Noted: 2018-01-29

## 2024-05-21 PROCEDURE — 90792 PSYCH DIAG EVAL W/MED SRVCS: CPT | Performed by: PSYCHIATRY & NEUROLOGY

## 2024-05-21 RX ORDER — ROSUVASTATIN CALCIUM 10 MG/1
TABLET, COATED ORAL
COMMUNITY

## 2024-05-21 RX ORDER — ONDANSETRON 8 MG/1
TABLET, ORALLY DISINTEGRATING ORAL
COMMUNITY

## 2024-05-21 RX ORDER — ARIPIPRAZOLE 10 MG/1
10 TABLET ORAL DAILY
Qty: 30 TABLET | Refills: 2 | Status: SHIPPED | OUTPATIENT
Start: 2024-05-21

## 2024-05-21 RX ORDER — AMLODIPINE BESYLATE 5 MG/1
5 TABLET ORAL DAILY
COMMUNITY

## 2024-05-21 RX ORDER — LORAZEPAM 0.5 MG/1
0.5 TABLET ORAL EVERY 8 HOURS PRN
Qty: 90 TABLET | Refills: 0 | Status: SHIPPED | OUTPATIENT
Start: 2024-05-21

## 2024-05-21 RX ORDER — LANSOPRAZOLE 30 MG/1
CAPSULE, DELAYED RELEASE ORAL
COMMUNITY

## 2024-05-21 RX ORDER — ATOMOXETINE 40 MG/1
40 CAPSULE ORAL DAILY
Qty: 30 CAPSULE | Refills: 2 | Status: SHIPPED | OUTPATIENT
Start: 2024-05-21

## 2024-05-21 RX ORDER — LIDOCAINE 50 MG/G
PATCH TOPICAL
COMMUNITY
Start: 2024-04-02

## 2024-05-21 RX ORDER — ARIPIPRAZOLE 10 MG/1
10 TABLET ORAL DAILY
COMMUNITY
Start: 2024-05-03 | End: 2024-05-21 | Stop reason: SDUPTHER

## 2024-05-21 RX ORDER — ATOMOXETINE 40 MG/1
40 CAPSULE ORAL DAILY
COMMUNITY
End: 2024-05-21 | Stop reason: SDUPTHER

## 2024-05-21 RX ORDER — LORAZEPAM 0.5 MG/1
0.5 TABLET ORAL EVERY 8 HOURS PRN
COMMUNITY
Start: 2024-05-01 | End: 2024-05-21 | Stop reason: SDUPTHER

## 2024-05-21 RX ORDER — CETIRIZINE HYDROCHLORIDE 10 MG/1
10 TABLET ORAL DAILY
COMMUNITY

## 2024-05-21 RX ORDER — GABAPENTIN 300 MG/1
CAPSULE ORAL
COMMUNITY
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
## 2024-06-03 ENCOUNTER — APPOINTMENT (OUTPATIENT)
Dept: LAB | Facility: CLINIC | Age: 44
End: 2024-06-03
Payer: COMMERCIAL

## 2024-06-03 DIAGNOSIS — Z00.6 ENCOUNTER FOR EXAMINATION FOR NORMAL COMPARISON OR CONTROL IN CLINICAL RESEARCH PROGRAM: ICD-10-CM

## 2024-06-18 ENCOUNTER — OFFICE VISIT (OUTPATIENT)
Dept: PSYCHIATRY | Facility: CLINIC | Age: 44
End: 2024-06-18
Payer: COMMERCIAL

## 2024-06-18 DIAGNOSIS — F90.1 ADHD, PREDOMINANTLY HYPERACTIVE TYPE: ICD-10-CM

## 2024-06-18 DIAGNOSIS — F41.1 GAD (GENERALIZED ANXIETY DISORDER): ICD-10-CM

## 2024-06-18 PROCEDURE — 99214 OFFICE O/P EST MOD 30 MIN: CPT | Performed by: PSYCHIATRY & NEUROLOGY

## 2024-06-18 PROCEDURE — 90833 PSYTX W PT W E/M 30 MIN: CPT | Performed by: PSYCHIATRY & NEUROLOGY

## 2024-06-18 RX ORDER — OMEPRAZOLE 20 MG/1
20 CAPSULE, DELAYED RELEASE ORAL DAILY
COMMUNITY
Start: 2024-06-12

## 2024-06-18 RX ORDER — MOMETASONE FUROATE AND FORMOTEROL FUMARATE DIHYDRATE 200; 5 UG/1; UG/1
AEROSOL RESPIRATORY (INHALATION)
COMMUNITY
Start: 2024-06-14

## 2024-06-18 RX ORDER — LORAZEPAM 0.5 MG/1
0.5 TABLET ORAL EVERY 8 HOURS PRN
Qty: 90 TABLET | Refills: 0 | Status: SHIPPED | OUTPATIENT
Start: 2024-06-18

## 2024-06-18 RX ORDER — HYDROCODONE BITARTRATE AND ACETAMINOPHEN 5; 300 MG/1; MG/1
TABLET ORAL
COMMUNITY

## 2024-06-18 RX ORDER — KETOCONAZOLE 20 MG/G
CREAM TOPICAL
COMMUNITY
Start: 2024-05-22

## 2024-06-18 RX ORDER — BENZONATATE 200 MG/1
CAPSULE ORAL
COMMUNITY
Start: 2024-06-13

## 2024-06-18 RX ORDER — ATOMOXETINE 80 MG/1
80 CAPSULE ORAL DAILY
Qty: 30 CAPSULE | Refills: 2 | Status: SHIPPED | OUTPATIENT
Start: 2024-06-18

## 2024-06-18 RX ORDER — TIZANIDINE 2 MG/1
TABLET ORAL
COMMUNITY
Start: 2024-05-24

## 2024-06-18 NOTE — BH TREATMENT PLAN
TREATMENT PLAN (Medication Management Only)        [facility]    Name and Date of Birth:  Lorna Antunez 44 y.o. 1980  Date of Treatment Plan: June 18, 2024  Diagnosis/Diagnoses:    1. ADHD, predominantly hyperactive type    2. AYALA (generalized anxiety disorder)      Strengths/Personal Resources for Self-Care: supportive family, taking medications as prescribed, ability to communicate needs.  Area/Areas of need (in own words): anxiety, anxiety symptoms, depression, depressive symptoms, mood instability, attention and concentration problems  1. Long Term Goal: improve control of ADHD symptoms.  Target Date:6 months - 12/18/2024  Person/Persons responsible for completion of goal: Lorna  2.  Short Term Objective (s) - How will we reach this goal?:   A. Provider new recommended medication/dosage changes and/or continue medication(s): continue current medications as prescribed.  B.  Increase Strattera to 80 mg daily  .  C. N/A.  Target Date:6 months - 12/18/2024  Person/Persons Responsible for Completion of Goal: Lorna  Progress Towards Goals: continuing treatment  Treatment Modality: medication management every 6 months  Review due 180 days from date of this plan: 6 months - 12/18/2024  Expected length of service: ongoing treatment  My Physician/PA/NP and I have developed this plan together and I agree to work on the goals and objectives. I understand the treatment goals that were developed for my treatment.

## 2024-06-18 NOTE — PSYCH
Visit Time    Visit Start Time: 2:00  Visit Stop Time: 2:30  Total Visit Duration:  30 minutes    Subjective: medication management      Patient ID: Lorna Antunez is a 44 y.o. female with Bipolar do     HPI ROS Appetite Changes and Sleep: normal appetite, normal energy level, no weight change, and normal number of sleep hours    Since last seen, went to pain management started trial of Vicodin, and it has not been helpful, also added Tizanidine 2 mg which helps but might need dose increase.  Still has difficulties falling and staying asleep. She has high anxiety and stated she helps her mother take care of her father with dementia at home. She stated it is difficult to help him get up after falling, they are exploring placement options.  She is having anxiety and mood instability, feeling drained and shutting down .  Patient continues to meet criteria for ADHD based on the following: difficulties sustaining attention in tasks, often does not seem to listen when spoken to,difficulties following instructions, difficulties finishing tasks, difficulties with organization and planning, avoidance of tasks that require mental effort, easily distracted and forgetful. The above symptoms have been present for years and have consistently affected her abilities to function in social, academic and occupational activities.   Will increase Strattera to 80 mg daily. Continue other medications as prescribed.  F/u in 4 weeks.    Review Of Systems:     Mood Anxiety, Depression, and Emotional Lability   Behavior Impulsive Behavior   Thought Content Disturbing Thoughts, Feelings   General Emotional Problems, Sleep Disturbances, and Decreased Functioning   Personality Change in Personality   Other Psych Symptoms Normal   Constitutional Negative   ENT Negative   Cardiovascular Negative   Respiratory Negative   Gastrointestinal Negative   Genitourinary Negative   Musculoskeletal Negative   Integumentary Negative   Neurological Negative    Endocrine Normal    Other Symptoms Normal        Laboratory Results: Recent Labs (last 12 months):   Transcribe Orders on 06/03/2024   Component Date Value    WBC 06/03/2024 6.03     RBC 06/03/2024 4.59     Hemoglobin 06/03/2024 13.6     Hematocrit 06/03/2024 42.6     MCV 06/03/2024 93     MCH 06/03/2024 29.6     MCHC 06/03/2024 31.9     RDW 06/03/2024 12.7     MPV 06/03/2024 11.5     Platelets 06/03/2024 261     nRBC 06/03/2024 0     Segmented % 06/03/2024 60     Immature Grans % 06/03/2024 1     Lymphocytes % 06/03/2024 26     Monocytes % 06/03/2024 8     Eosinophils Relative 06/03/2024 4     Basophils Relative 06/03/2024 1     Absolute Neutrophils 06/03/2024 3.69     Absolute Immature Grans 06/03/2024 0.04     Absolute Lymphocytes 06/03/2024 1.57     Absolute Monocytes 06/03/2024 0.45     Eosinophils Absolute 06/03/2024 0.21     Basophils Absolute 06/03/2024 0.07     Sodium 06/03/2024 139     Potassium 06/03/2024 4.8     Chloride 06/03/2024 103     CO2 06/03/2024 28     ANION GAP 06/03/2024 8     BUN 06/03/2024 11     Creatinine 06/03/2024 0.66     Glucose, Fasting 06/03/2024 104 (H)     Calcium 06/03/2024 9.1     AST 06/03/2024 19     ALT 06/03/2024 19     Alkaline Phosphatase 06/03/2024 50     Total Protein 06/03/2024 6.2 (L)     Albumin 06/03/2024 4.3     Total Bilirubin 06/03/2024 0.54     eGFR 06/03/2024 107     Cholesterol 06/03/2024 141     Triglycerides 06/03/2024 109     HDL, Direct 06/03/2024 66     LDL Calculated 06/03/2024 53     TSH 3RD GENERATON 06/03/2024 1.331    Admission on 04/10/2024, Discharged on 04/10/2024   Component Date Value    Amph/Meth UR 04/10/2024 Negative     Barbiturate Ur 04/10/2024 Positive (A)     Benzodiazepine Urine 04/10/2024 Negative     Cocaine Urine 04/10/2024 Negative     Methadone Urine 04/10/2024 Negative     Opiate Urine 04/10/2024 Negative     PCP Ur 04/10/2024 Negative     THC Urine 04/10/2024 Positive (A)     Oxycodone Urine 04/10/2024 Negative     Fentanyl Urine  04/10/2024 Negative     HYDROCODONE URINE 04/10/2024 Negative     EXTBreath Alcohol 04/10/2024 0.057     EXT Preg Test, Ur 04/10/2024 Negative     Control 04/10/2024 Valid    Office Visit on 01/22/2024   Component Date Value    Case Report 01/22/2024                      Value:Gynecologic Cytology Report                       Case: [id number]                                  Authorizing Provider:  Wyatt Patel MD          Collected:           01/22/2024 1437              Ordering Location:     [facility]  Received:            01/22/2024 1437                                     [facility]                                                           First Screen:          MIKE Jimenez                                                    Specimen:    LIQUID-BASED PAP, SCREENING, Cervix, Endocervical                                          Primary Interpretation 01/22/2024 Negative for intraepithelial lesion or malignancy     Interpretation 01/22/2024 Fungal organisms morphologically consistent with Candida spp     Specimen Adequacy 01/22/2024 Satisfactory for evaluation. Endocervical/transformation zone component present.     Additional Information 01/22/2024                      Value:This result contains rich text formatting which cannot be displayed here.    HPV Other HR 01/22/2024 Negative     HPV16 01/22/2024 Negative     HPV18 01/22/2024 Negative          Substance Abuse History:  Social History     Substance and Sexual Activity   Drug Use No       Family Psychiatric History:   Family History   Problem Relation Age of Onset    Aortic stenosis Mother     Diabetes Mother     Hypertension Mother     Hyperlipidemia Mother     Diabetes Father     Hypertension Father     Hyperlipidemia Father     Heart murmur Brother     Hypertension Brother        The following portions of the patient's history were reviewed and updated as appropriate: allergies, current medications, past family  history, past medical history, past social history, past surgical history, and problem list.    Social History     Socioeconomic History    Marital status: /Civil Union     Spouse name: Not on file    Number of children: 2    Years of education: some college    Highest education level: Not on file   Occupational History    Occupation: Unemployed   Tobacco Use    Smoking status: Never    Smokeless tobacco: Never   Substance and Sexual Activity    Alcohol use: No    Drug use: No    Sexual activity: Yes     Partners: Male     Birth control/protection: Male Sterilization   Other Topics Concern    Not on file   Social History Narrative    Not on file     Social Determinants of Health     Financial Resource Strain: High Risk (9/28/2023)    [facility]    Overall Financial Resource Strain (CARDIA)     Difficulty of Paying Living Expenses: Hard   Food Insecurity: Food Insecurity Present (9/28/2023)    [facility]    Hunger Vital Sign     Worried About Running Out of Food in the Last Year: Sometimes true     Ran Out of Food in the Last Year: Sometimes true   Transportation Needs: No Transportation Needs (9/28/2023)    [facility]    PRAPARE - Transportation     Lack of Transportation (Medical): No     Lack of Transportation (Non-Medical): No   Physical Activity: Not on file   Stress: Stress Concern Present (9/28/2023)    [facility]    [employer] - Occupational Stress Questionnaire     Feeling of Stress : Very much   Social Connections: Moderately Isolated (9/28/2023)    [facility]    Social Connection and Isolation Panel [NHANES]     Frequency of Communication with Friends and Family:  More than three times a week     Frequency of Social Gatherings with Friends and Family: Once a week     Attends Sikh Services: Never     Active Member of Clubs or Organizations: No     Attends Club or Organization Meetings: Never     Marital Status:    Intimate Partner Violence: Not At Risk (9/28/2023)    [facility]    Humiliation, Afraid, Rape, and Kick questionnaire     Fear of Current or Ex-Partner: No     Emotionally Abused: No     Physically Abused: No     Sexually Abused: No   Housing Stability: High Risk (9/28/2023)    [facility]    Housing Stability Vital Sign     Unable to Pay for Housing in the Last Year: Yes     Number of Places Lived in the Last Year: 1     Unstable Housing in the Last Year: No     Social History     Social History Narrative    Not on file       Objective:       Mental status:  Appearance calm and cooperative , adequate hygiene and grooming, and good eye contact    Mood dysphoric   Affect affect was constricted   Speech a normal rate and fluent   Thought Processes coherent/organized and normal thought processes   Hallucinations no hallucinations present    Thought Content no delusions   Abnormal Thoughts no suicidal thoughts  and no homicidal thoughts    Orientation  oriented to person and place and time   Remote Memory short term memory intact and long term memory intact   Attention Span concentration intact   Intellect Appears to be of Average Intelligence   Insight Limited insight   Judgement judgment was limited   Muscle Strength Muscle strength and tone were normal and Normal gait    Language no difficulty naming common objects and no difficulty repeating a phrase    Fund of Knowledge displays adequate knowledge of current events, adequate fund of knowledge regarding past history, and adequate fund of knowledge regarding vocabulary                 Assessment/Plan:       Diagnoses and all orders for this visit:    ADHD, predominantly hyperactive type  -     atoMOXetine (STRATTERA) 80 MG capsule; Take 1 capsule (80 mg total) by mouth daily    AYALA (generalized anxiety disorder)  -     LORazepam (ATIVAN) 0.5 mg tablet; Take 1 tablet (0.5 mg total) by mouth every 8 (eight) hours as needed for anxiety    Other orders  -     HYDROcodone-acetaminophen (XODOL) 5-300 MG per tablet; Take 1 tablet every 4 hours by oral route as needed.  -     benzonatate (TESSALON) 200 MG capsule  -     ketoconazole (NIZORAL) 2 % cream; APPLY TOPICALLY 2 TIMES A DAY AS NEEDED FOR RASH.  -     tiZANidine (ZANAFLEX) 2 mg tablet; take 1 tablet by mouth twice a day if needed for pain or muscle spasm  -     Dulera 200-5 MCG/ACT inhaler  -     omeprazole (PriLOSEC) 20 mg delayed release capsule; Take 20 mg by mouth daily            Treatment Recommendations- Risks Benefits      Immediate Medical/Psychiatric/Psychotherapy Treatments and Any Precautions: as stated on HPI    Risks, Benefits And Possible Side Effects Of Medications:  {PSYCH RISK, BENEFITS AND POSSIBLE SIDE EFFECTS (Optional):63381    Controlled Medication Discussion: Discussed with patient Black Box warning on concurrent use of benzodiazepines and opioid medications including sedation, respiratory depression, coma and death. Patient understands the risk of treatment with benzodiazepines in addition to opioids and wants to continue taking those medications. , Discussed with patient the risks of sedation, respiratory depression, impairment of ability to drive and potential for abuse and addiction related to treatment with benzodiazepine medications. The patient understands risk of treatment with benzodiazepine medications, agrees to not drive if feels impaired and agrees to take medications as prescribed., and The patient has been filling controlled prescriptions on time as prescribed to [place] Pres[facility] Drug  Monitoring program.      Psychotherapy Provided: Individual psychotherapy provided.       Individual psychotherapy provided: Yes  Counseling was provided during the session today for 16 minutes.  Medications, treatment progress and treatment plan reviewed with Lorna.  Medication changes discussed with Lorna.  Medication education provided to Lorna.  Coping strategies including compliance with medications, deep/slow breathing, eliminating avoidance, engaging in previously avoided activities, getting into a good routine, increasing interest in usual activities, increasing motivation, maintain healthy diet, maintain heathy sleeping hygiene, and maintain positive attitude reviewed with Lorna.

## 2024-07-10 DIAGNOSIS — F90.1 ADHD, PREDOMINANTLY HYPERACTIVE TYPE: ICD-10-CM

## 2024-07-10 RX ORDER — ATOMOXETINE 80 MG/1
80 CAPSULE ORAL DAILY
Qty: 90 CAPSULE | Refills: 1 | Status: SHIPPED | OUTPATIENT
Start: 2024-07-10

## 2024-07-12 DIAGNOSIS — F41.1 GAD (GENERALIZED ANXIETY DISORDER): ICD-10-CM

## 2024-07-12 RX ORDER — LORAZEPAM 0.5 MG/1
0.5 TABLET ORAL EVERY 8 HOURS PRN
Qty: 90 TABLET | Refills: 0 | Status: SHIPPED | OUTPATIENT
Start: 2024-07-12

## 2024-07-12 NOTE — TELEPHONE ENCOUNTER
Medication Refill Request     Name of Medication ativan  Dose/Frequency 0.5 mg take 1 tablet by mouth every 8 hours  as needed  Quantity 90  Verified pharmacy   [x]  Verified ordering Provider   [x]  Does patient have enough for the next 3 days? Yes [x] No []  Does patient have a follow-up appointment scheduled? Yes [x] No []   If so when is appointment: 8/6/2024 1:30pm

## 2024-08-06 ENCOUNTER — OFFICE VISIT (OUTPATIENT)
Dept: PSYCHIATRY | Facility: CLINIC | Age: 44
End: 2024-08-06
Payer: COMMERCIAL

## 2024-08-06 DIAGNOSIS — F31.62 BIPOLAR DISORDER, CURRENT EPISODE MIXED, MODERATE (HCC): Primary | ICD-10-CM

## 2024-08-06 DIAGNOSIS — F41.1 GAD (GENERALIZED ANXIETY DISORDER): ICD-10-CM

## 2024-08-06 DIAGNOSIS — F90.1 ADHD, PREDOMINANTLY HYPERACTIVE TYPE: ICD-10-CM

## 2024-08-06 PROCEDURE — 90833 PSYTX W PT W E/M 30 MIN: CPT | Performed by: PSYCHIATRY & NEUROLOGY

## 2024-08-06 PROCEDURE — 99214 OFFICE O/P EST MOD 30 MIN: CPT | Performed by: PSYCHIATRY & NEUROLOGY

## 2024-08-06 RX ORDER — DEXTROAMPHETAMINE SACCHARATE, AMPHETAMINE ASPARTATE, DEXTROAMPHETAMINE SULFATE AND AMPHETAMINE SULFATE 2.5; 2.5; 2.5; 2.5 MG/1; MG/1; MG/1; MG/1
10 TABLET ORAL
Qty: 60 TABLET | Refills: 0 | Status: SHIPPED | OUTPATIENT
Start: 2024-08-06

## 2024-08-06 RX ORDER — LORAZEPAM 0.5 MG/1
0.5 TABLET ORAL EVERY 8 HOURS PRN
Qty: 90 TABLET | Refills: 0 | Status: SHIPPED | OUTPATIENT
Start: 2024-08-06

## 2024-08-06 RX ORDER — ARIPIPRAZOLE 10 MG/1
10 TABLET ORAL DAILY
Qty: 30 TABLET | Refills: 2 | Status: SHIPPED | OUTPATIENT
Start: 2024-08-06

## 2024-08-06 NOTE — PSYCH
Visit Time    Visit Start Time: 1:30  Visit Stop Time: 2:00  Total Visit Duration:  30 minutes    Subjective: medication management      Patient ID: Lorna Antunez is a 44 y.o. female with Bipolar do     HPI ROS Appetite Changes and Sleep: normal appetite, normal energy level, no weight change, and normal number of sleep hours    Since last seen, went to pain management started trial of Vicodin, and it has not been helpful, also added Tizanidine 2 mg which helps but  needed dose increase to 4 mg. Vicodin was switched to Percocet.  Still has difficulties falling and staying asleep. She has high anxiety and stated she helps her mother take care of her father with dementia at home. She stated it is difficult to help him get up after falling, they are exploring placement options.  She is having anxiety and mood instability, feeling drained and shutting down .  Patient continues to meet criteria for ADHD based on the following: difficulties sustaining attention in tasks, often does not seem to listen when spoken to,difficulties following instructions, difficulties finishing tasks, difficulties with organization and planning, avoidance of tasks that require mental effort, easily distracted and forgetful. The above symptoms have been present for years and have consistently affected her abilities to function in social, academic and occupational activities.     Since last seen she stated she has been anxious and her symptoms of ADHD have been severe. She feels that Strattera has not been helping, although we could still increase dose to 100 mg , I rather switch medications based on no benefits at 80 mg.  She has never tied a stimulant and she has concerns about HTN and hear murmur and hx of gastric bypass. Will start IR Adderall 10 mg po bid.  Will schedule follow up 4 weeks     Review Of Systems:     Mood Anxiety, Depression, and Emotional Lability   Behavior Impulsive Behavior   Thought Content Disturbing Thoughts,  Feelings   General Emotional Problems, Sleep Disturbances, and Decreased Functioning   Personality Change in Personality   Other Psych Symptoms Normal   Constitutional Negative   ENT Negative   Cardiovascular Negative   Respiratory Negative   Gastrointestinal Negative   Genitourinary Negative   Musculoskeletal Negative   Integumentary Negative   Neurological Negative   Endocrine Normal    Other Symptoms Normal        Laboratory Results: Recent Labs (last 12 months):   Transcribe Orders on 06/03/2024   Component Date Value    WBC 06/03/2024 6.03     RBC 06/03/2024 4.59     Hemoglobin 06/03/2024 13.6     Hematocrit 06/03/2024 42.6     MCV 06/03/2024 93     MCH 06/03/2024 29.6     MCHC 06/03/2024 31.9     RDW 06/03/2024 12.7     MPV 06/03/2024 11.5     Platelets 06/03/2024 261     nRBC 06/03/2024 0     Segmented % 06/03/2024 60     Immature Grans % 06/03/2024 1     Lymphocytes % 06/03/2024 26     Monocytes % 06/03/2024 8     Eosinophils Relative 06/03/2024 4     Basophils Relative 06/03/2024 1     Absolute Neutrophils 06/03/2024 3.69     Absolute Immature Grans 06/03/2024 0.04     Absolute Lymphocytes 06/03/2024 1.57     Absolute Monocytes 06/03/2024 0.45     Eosinophils Absolute 06/03/2024 0.21     Basophils Absolute 06/03/2024 0.07     Sodium 06/03/2024 139     Potassium 06/03/2024 4.8     Chloride 06/03/2024 103     CO2 06/03/2024 28     ANION GAP 06/03/2024 8     BUN 06/03/2024 11     Creatinine 06/03/2024 0.66     Glucose, Fasting 06/03/2024 104 (H)     Calcium 06/03/2024 9.1     AST 06/03/2024 19     ALT 06/03/2024 19     Alkaline Phosphatase 06/03/2024 50     Total Protein 06/03/2024 6.2 (L)     Albumin 06/03/2024 4.3     Total Bilirubin 06/03/2024 0.54     eGFR 06/03/2024 107     Cholesterol 06/03/2024 141     Triglycerides 06/03/2024 109     HDL, Direct 06/03/2024 66     LDL Calculated 06/03/2024 53     TSH 3RD GENERATON 06/03/2024 1.331    Admission on 04/10/2024, Discharged on 04/10/2024   Component Date  Value    Amph/Meth UR 04/10/2024 Negative     Barbiturate Ur 04/10/2024 Positive (A)     Benzodiazepine Urine 04/10/2024 Negative     Cocaine Urine 04/10/2024 Negative     Methadone Urine 04/10/2024 Negative     Opiate Urine 04/10/2024 Negative     PCP Ur 04/10/2024 Negative     THC Urine 04/10/2024 Positive (A)     Oxycodone Urine 04/10/2024 Negative     Fentanyl Urine 04/10/2024 Negative     HYDROCODONE URINE 04/10/2024 Negative     EXTBreath Alcohol 04/10/2024 0.057     EXT Preg Test, Ur 04/10/2024 Negative     Control 04/10/2024 Valid    Office Visit on 01/22/2024   Component Date Value    Case Report 01/22/2024                      Value:Gynecologic Cytology Report                       Case: [id number]                                  Authorizing Provider:  Waytt Patel MD          Collected:           01/22/2024 1437              Ordering Location:     [facility]  Received:            01/22/2024 [address] [place] [address]                                                           First Screen:          MIKE Jimenez                                                    Specimen:    LIQUID-BASED PAP, SCREENING, Cervix, Endocervical                                          Primary Interpretation 01/22/2024 Negative for intraepithelial lesion or malignancy     Interpretation 01/22/2024 Fungal organisms morphologically consistent with Candida spp     Specimen Adequacy 01/22/2024 Satisfactory for evaluation. Endocervical/transformation zone component present.     Additional Information 01/22/2024                      Value:frestyl's FDA approved ,  and ThinPrep Imaging Duo System are                           utilized with strict adherence to the 's instruction manual to                           prepare gynecologic and non-gynecologic cytology specimens for the                           production of ThinPrep slides as well as for  gynecologic ThinPrep imaging.                           These processes have been validated by our laboratory and/or by the                           .                          The Pap test is not a diagnostic procedure and should not be used as the                           sole means to detect cervical cancer. It is only a screening procedure to                           aid in the detection of cervical cancer and its precursors. Both                           false-negative and false-positive results have been experienced. Your                           patient's test result should be interpreted in this context together with                           the history and clinical findings.    HPV Other HR 01/22/2024 Negative     HPV16 01/22/2024 Negative     HPV18 01/22/2024 Negative          Substance Abuse History:  Social History     Substance and Sexual Activity   Drug Use No       Family Psychiatric History:   Family History   Problem Relation Age of Onset    Aortic stenosis Mother     Diabetes Mother     Hypertension Mother     Hyperlipidemia Mother     Diabetes Father     Hypertension Father     Hyperlipidemia Father     Heart murmur Brother     Hypertension Brother        The following portions of the patient's history were reviewed and updated as appropriate: allergies, current medications, past family history, past medical history, past social history, past surgical history, and problem list.    Social History     Socioeconomic History    Marital status: /Civil Union     Spouse name: Not on file    Number of children: 2    Years of education: some college    Highest education level: Not on file   Occupational History    Occupation: Unemployed   Tobacco Use    Smoking status: Never    Smokeless tobacco: Never   Substance and Sexual Activity    Alcohol use: No    Drug use: No    Sexual activity: Yes     Partners: Male     Birth control/protection: Male Sterilization   Other Topics Concern     Not on file   Social History Narrative    Not on file     Social Determinants of Health     Financial Resource Strain: High Risk (9/28/2023)    [facility]    Overall Financial Resource Strain (CARDIA)     Difficulty of Paying Living Expenses: Hard   Food Insecurity: Food Insecurity Present (9/28/2023)    [facility] from [facility], [facility]    Hunger Vital Sign     Worried About Running Out of Food in the Last Year: Sometimes true     Ran Out of Food in the Last Year: Sometimes true   Transportation Needs: No Transportation Needs (9/28/2023)    Received from [facility], [facility]    PRAPARE - Transportation     Lack of Transportation (Medical): No     Lack of Transportation (Non-Medical): No   Physical Activity: Not on file   Stress: Stress Concern Present (9/28/2023)    Received from [facility], [facility]    [employer] [place] - Occupational Stress Questionnaire     Feeling of Stress : Very much   Social Connections: Moderately Isolated (9/28/2023)    Received from [facility], [facility]    Social Connection and Isolation Panel [NHANES]     Frequency of Communication with Friends and Family: More than three times a week     Frequency of Social Gatherings with Friends and Family: Once a week     Attends Muslim Services: Never     Active Member of Clubs or Organizations: No     Attends Club or Organization Meetings: Never     Marital Status:    Intimate Partner Violence: Not At Risk (9/28/2023)    Received from [facility], [facility]    Humiliation, Afraid, Rape, and Kick questionnaire     Fear of Current or Ex-Partner: No     Emotionally Abused: No     Physically Abused: No     Sexually Abused: No   Housing Stability: High Risk (9/28/2023)     [facility], [facility]    Housing Stability Vital Sign     Unable to Pay for Housing in the Last Year: Yes     Number of Places Lived in the Last Year: 1     Unstable Housing in the Last Year: No     Social History     Social History Narrative    Not on file       Objective:       Mental status:  Appearance calm and cooperative , adequate hygiene and grooming, and good eye contact    Mood dysphoric   Affect affect was constricted   Speech a normal rate and fluent   Thought Processes coherent/organized and normal thought processes   Hallucinations no hallucinations present    Thought Content no delusions   Abnormal Thoughts no suicidal thoughts  and no homicidal thoughts    Orientation  oriented to person and place and time   Remote Memory short term memory intact and long term memory intact   Attention Span concentration intact   Intellect Appears to be of Average Intelligence   Insight Limited insight   Judgement judgment was limited   Muscle Strength Muscle strength and tone were normal and Normal gait    Language no difficulty naming common objects and no difficulty repeating a phrase    Fund of Knowledge displays adequate knowledge of current events, adequate fund of knowledge regarding past history, and adequate fund of knowledge regarding vocabulary                Assessment/Plan:       Diagnoses and all orders for this visit:    ADHD, predominantly hyperactive type  -     amphetamine-dextroamphetamine (ADDERALL, 10MG,) 10 mg tablet; Take 1 tablet (10 mg total) by mouth 2 (two) times a day Max Daily Amount: 20 mg    Bipolar disorder, current episode mixed, moderate (HCC)  -     ARIPiprazole (ABILIFY) 10 mg tablet; Take 1 tablet (10 mg total) by mouth daily    AYALA (generalized anxiety disorder)  -     LORazepam (ATIVAN) 0.5 mg tablet; Take 1 tablet (0.5 mg total) by mouth every 8 (eight) hours as needed for anxiety    Other orders  -     oxyCODONE (ROXICODONE) 5  mg  -     tiZANidine (ZANAFLEX) 4 mg tablet; take 1 tablet by mouth twice a day if needed for pain or SPASM(S)              Treatment Recommendations- Risks Benefits      Immediate Medical/Psychiatric/Psychotherapy Treatments and Any Precautions: as stated on HPI    Risks, Benefits And Possible Side Effects Of Medications:  {PSYCH RISK, BENEFITS AND POSSIBLE SIDE EFFECTS (Optional):41173    Controlled Medication Discussion: Discussed with patient Black Box warning on concurrent use of benzodiazepines and opioid medications including sedation, respiratory depression, coma and death. Patient understands the risk of treatment with benzodiazepines in addition to opioids and wants to continue taking those medications. , Discussed with patient the risks of sedation, respiratory depression, impairment of ability to drive and potential for abuse and addiction related to treatment with benzodiazepine medications. The patient understands risk of treatment with benzodiazepine medications, agrees to not drive if feels impaired and agrees to take medications as prescribed., and The patient has been filling controlled prescriptions on time as prescribed to [place] Prescription Drug Monitoring program.      Psychotherapy Provided: Individual psychotherapy provided.       Individual psychotherapy provided: Yes  Counseling was provided during the session today for 16 minutes.  Medications, treatment progress and treatment plan reviewed with Lorna.  Medication changes discussed with Lorna.  Medication education provided to Lorna.  Coping strategies including compliance with medications, deep/slow breathing, eliminating avoidance, engaging in previously avoided activities, getting into a good routine, increasing interest in usual activities, increasing motivation, maintain healthy diet, maintain heathy sleeping hygiene, and maintain positive attitude reviewed with Lorna.

## 2024-08-28 ENCOUNTER — TELEPHONE (OUTPATIENT)
Age: 44
End: 2024-08-28

## 2024-08-28 DIAGNOSIS — F90.1 ADHD, PREDOMINANTLY HYPERACTIVE TYPE: ICD-10-CM

## 2024-08-28 RX ORDER — DEXTROAMPHETAMINE SACCHARATE, AMPHETAMINE ASPARTATE, DEXTROAMPHETAMINE SULFATE AND AMPHETAMINE SULFATE 3.75; 3.75; 3.75; 3.75 MG/1; MG/1; MG/1; MG/1
15 TABLET ORAL 2 TIMES DAILY
Qty: 60 TABLET | Refills: 0 | Status: SHIPPED | OUTPATIENT
Start: 2024-08-28 | End: 2024-08-29 | Stop reason: SDUPTHER

## 2024-08-28 NOTE — TELEPHONE ENCOUNTER
Patient called and stated provider increased her dose of adderall to 15mg and [facility] [place] [facility]

## 2024-08-29 DIAGNOSIS — F90.1 ADHD, PREDOMINANTLY HYPERACTIVE TYPE: ICD-10-CM

## 2024-08-29 RX ORDER — DEXTROAMPHETAMINE SACCHARATE, AMPHETAMINE ASPARTATE, DEXTROAMPHETAMINE SULFATE AND AMPHETAMINE SULFATE 3.75; 3.75; 3.75; 3.75 MG/1; MG/1; MG/1; MG/1
15 TABLET ORAL 2 TIMES DAILY
Qty: 60 TABLET | Refills: 0 | Status: SHIPPED | OUTPATIENT
Start: 2024-08-29

## 2024-08-29 RX ORDER — DEXTROAMPHETAMINE SACCHARATE, AMPHETAMINE ASPARTATE, DEXTROAMPHETAMINE SULFATE AND AMPHETAMINE SULFATE 3.75; 3.75; 3.75; 3.75 MG/1; MG/1; MG/1; MG/1
15 TABLET ORAL 2 TIMES DAILY
Qty: 60 TABLET | Refills: 0 | Status: SHIPPED | OUTPATIENT
Start: 2024-08-29 | End: 2024-08-29 | Stop reason: SDUPTHER

## 2024-08-29 NOTE — TELEPHONE ENCOUNTER
Patient called and stated she needs the ADDERALL 15 mg sent to a different pharmacy her usual one is on back order.      New Pharmacy : [facility]                              [address]  PH# [phone]

## 2024-09-05 ENCOUNTER — TELEMEDICINE (OUTPATIENT)
Dept: PSYCHIATRY | Facility: CLINIC | Age: 44
End: 2024-09-05
Payer: COMMERCIAL

## 2024-09-05 DIAGNOSIS — F31.32 BIPOLAR AFFECTIVE DISORDER, CURRENTLY DEPRESSED, MODERATE (HCC): Primary | ICD-10-CM

## 2024-09-05 DIAGNOSIS — F41.1 GAD (GENERALIZED ANXIETY DISORDER): ICD-10-CM

## 2024-09-05 DIAGNOSIS — F90.1 ADHD, PREDOMINANTLY HYPERACTIVE TYPE: ICD-10-CM

## 2024-09-05 PROCEDURE — 99214 OFFICE O/P EST MOD 30 MIN: CPT | Performed by: PSYCHIATRY & NEUROLOGY

## 2024-09-05 RX ORDER — LORAZEPAM 0.5 MG/1
0.5 TABLET ORAL EVERY 8 HOURS PRN
Qty: 90 TABLET | Refills: 0 | Status: SHIPPED | OUTPATIENT
Start: 2024-09-05

## 2024-09-05 RX ORDER — LORAZEPAM 0.5 MG/1
0.5 TABLET ORAL EVERY 8 HOURS PRN
Qty: 90 TABLET | Refills: 0 | Status: SHIPPED | OUTPATIENT
Start: 2024-09-05 | End: 2024-09-05 | Stop reason: SDUPTHER

## 2024-09-05 RX ORDER — PREDNISONE 10 MG/1
TABLET ORAL
COMMUNITY
Start: 2024-08-26

## 2024-09-05 RX ORDER — BUPRENORPHINE 10 UG/H
PATCH TRANSDERMAL
COMMUNITY
Start: 2024-08-21

## 2024-09-05 NOTE — TELEPHONE ENCOUNTER
Medication Refill Request / pt [facility] at [facility] see below    Name of Medication ATIVAN) 0.5 mg tablet  Dose/Frequency 0.5 mg/Take 1 tablet (0.5 mg total) by mouth every 8 (eight) hours as needed for anxiety  Quantity 90  Verified pharmacy   [x][facility] [address]  Verified ordering Provider   []  Does patient have enough for the next 3 days? Yes [] No [x]  Does patient have a follow-up appointment scheduled? Yes [] No [x]   If so when is appointment:

## 2024-09-05 NOTE — PSYCH
Virtual Regular Visit    Verification of patient location:    Patient is located at Home in the following state in which I hold an active license [place]      Assessment/Plan:    Problem List Items Addressed This Visit          Behavioral Health    ADHD, predominantly hyperactive type     Continue Adderall 15 mg po bid         Relevant Medications    LORazepam (ATIVAN) 0.5 mg tablet    Bipolar affective disorder, currently active (HCC) - Primary     Currently on Abilify  ADHD vs Bipolar do  Mood stabilized after starting Adderall IR 15 mg po bid         Relevant Medications    LORazepam (ATIVAN) 0.5 mg tablet    AYALA (generalized anxiety disorder)     Multiple situational stressors  Continue Lorazepam 0.5 mg po tid prn           Relevant Medications    LORazepam (ATIVAN) 0.5 mg tablet                Reason for visit is   Chief Complaint   Patient presents with    Virtual Regular Visit          Encounter provider Mandy Huang MD      Recent Visits  No visits were found meeting these conditions.  Showing recent visits within past 7 days and meeting all other requirements  Today's Visits  Date Type Provider Dept   09/05/24 Telemedicine Mandy Huang MD  [facility]   Showing today's visits and meeting all other requirements  Future Appointments  No visits were found meeting these conditions.  Showing future appointments within next 150 days and meeting all other requirements       The patient was identified by name and date of birth. Lorna Antunez was informed that this is a telemedicine visit and that the visit is being conducted throughthe Epic Embedded platform. She agrees to proceed..  My office door was closed. No one else was in the room.  She acknowledged consent and understanding of privacy and security of the video platform. The patient has agreed to participate and understands they can discontinue the visit at any time.    Patient is aware this is a billable service.      Subjective  Lorna Antunez is a 44 y.o. female  Bipolar do and ADHD  . Patient stated that she had to switch to VV due to testing positive for COVID.   Father is currently hospitalized and they are bed searching for acute rehab. He was admitted for UTI.  Overall mood has been stable and sleep has improved.   Pain management changed 5 mg oxycodone to buprenorphine patch and the dose is being titrated.   Currently on Abilify, Lorazepam and Adderall IR 15 mg po bid after last dose adjustment.  Symptoms improved and feeling overall better than she has in a long time.  No significant side effects reported.          HPI     Past Medical History:   Diagnosis Date    Hip dysplasia     last assessed: 9/15/15    Insulin controlled gestational diabetes mellitus in third trimester     last assessed: 9/23/15       Past Surgical History:   Procedure Laterality Date    CARPAL TUNNEL RELEASE Bilateral     FL GUIDED NEEDLE PLAC BX/ASP/INJ  9/10/2018    FL GUIDED NEEDLE PLAC BX/ASP/INJ  12/5/2018    FL GUIDED NEEDLE PLAC BX/ASP/INJ  12/27/2019    LAPAROSCOPIC CHOLECYSTECTOMY      LAILA-EN-Y PROCEDURE      TOOTH EXTRACTION      TOTAL HIP ARTHROPLASTY Left     TOTAL HIP ARTHROPLASTY Right     revision March 2023    TRIGGER FINGER RELEASE Right        Current Outpatient Medications   Medication Sig Dispense Refill    LORazepam (ATIVAN) 0.5 mg tablet Take 1 tablet (0.5 mg total) by mouth every 8 (eight) hours as needed for anxiety 90 tablet 0    predniSONE 10 mg tablet TAKE 4 TABLETS DAILY X3 DAYS, 3 TABS X3 DAYS, 2 TABS X3 DAYS, THEN 1 TAB X3 DAYS      transdermal buprenorphine (BUTRANS) 10 mcg/hr TD patch apply 1 patch topically to CLEAN, DRY, AND INTACT SKIN REMOVE AND...  (REFER TO PRESCRIPTION NOTES).      acetaminophen (TYLENOL) 325 mg tablet Take by mouth      albuterol (ACCUNEB) 0.63 MG/3ML nebulizer solution Inhale 0.63 mg Three times a day      albuterol (PROVENTIL HFA,VENTOLIN HFA) 90 mcg/act inhaler Inhale 2 puffs every  6 (six) hours      amLODIPine (NORVASC) 5 mg tablet Take 5 mg by mouth daily      amphetamine-dextroamphetamine (ADDERALL, 15MG,) 15 MG tablet Take 1 tablet (15 mg total) by mouth 2 (two) times a day Max Daily Amount: 30 mg 60 tablet 0    ARIPiprazole (ABILIFY) 10 mg tablet Take 1 tablet (10 mg total) by mouth daily 30 tablet 2    B Complex-C (SURBEX-T PO) Take 1 tablet by mouth every morning      benzonatate (TESSALON) 200 MG capsule       Butalbital-APAP-Caffeine -40 MG CAPS Take 1 capsule by mouth Three times a day      cetirizine (ZyrTEC) 10 mg tablet Take 10 mg by mouth daily      Cholecalciferol (VITAMIN D3) 5000 units CHEW Chew 5,000 Units      diphenhydrAMINE (BENADRYL) 50 MG tablet Take 50 mg by mouth every 6 (six) hours      Dulera 200-5 MCG/ACT inhaler       EPINEPHrine (EPIPEN) 0.3 mg/0.3 mL SOAJ Inject 0.3 mg into the shoulder, thigh, or buttocks      gabapentin (NEURONTIN) 300 mg capsule TAKE 1 CAPSULE BY MOUTH EVERY 4 HOURS      ketoconazole (NIZORAL) 2 % cream APPLY TOPICALLY 2 TIMES A DAY AS NEEDED FOR RASH.      lansoprazole (PREVACID) 30 mg capsule       lidocaine (LIDODERM) 5 % APPLY 1 TO 3 PATCHES TO AFFECTED AREA FOR UP TO 12 HOURS DAILY THEN REMOVE FOR 12 HOURS.      lisinopril (ZESTRIL) 40 mg tablet Take 40 mg by mouth      melatonin 3 mg Take 3 mg by mouth      Mometasone Furo-Formoterol Fum (DULERA) 100-5 MCG/ACT AERO Inhale      omeprazole (PriLOSEC) 20 mg delayed release capsule Take 20 mg by mouth daily      omeprazole (PriLOSEC) 40 MG capsule TAKE ONE CAPSULE BY MOUTH IN THE MORNING      ondansetron (ZOFRAN-ODT) 8 mg disintegrating tablet DISSOLVE 1 TABLET (8 MG TOTAL) IN CHEEK EVERY 8 (EIGHT) HOURS AS NEEDED FOR NAUSEA OR VOMITING.      oxyCODONE (ROXICODONE) 5 mg       patient supplied medication Take 1 Dose by mouth      rosuvastatin (CRESTOR) 10 MG tablet take 1 tablet by mouth every day at night      tiZANidine (ZANAFLEX) 2 mg tablet take 1 tablet by mouth twice a day if  needed for pain or muscle spasm      tiZANidine (ZANAFLEX) 4 mg tablet take 1 tablet by mouth twice a day if needed for pain or SPASM(S)       No current facility-administered medications for this visit.        Allergies   Allergen Reactions    Other Hives, Other (See Comments) and Swelling     Will trigger asthma    Montelukast Anxiety     anxiety  anxiety       Review of Systems     Mood Anxiety and Emotional Lability   Behavior Impulsive Behavior   Thought Content Disturbing Thoughts, Feelings   General Emotional Problems, Sleep Disturbances, and Decreased Functioning   Personality Change in Personality   Other Psych Symptoms Normal   Constitutional Negative   ENT Negative   Cardiovascular Negative   Respiratory Negative   Gastrointestinal Negative   Genitourinary Negative   Musculoskeletal Negative   Integumentary Negative   Neurological Negative   Endocrine Normal    Other Symptoms Normal        Laboratory Results: Recent Labs (last 6 months):   Transcribe Orders on 06/03/2024   Component Date Value    WBC 06/03/2024 6.03     RBC 06/03/2024 4.59     Hemoglobin 06/03/2024 13.6     Hematocrit 06/03/2024 42.6     MCV 06/03/2024 93     MCH 06/03/2024 29.6     MCHC 06/03/2024 31.9     RDW 06/03/2024 12.7     MPV 06/03/2024 11.5     Platelets 06/03/2024 261     nRBC 06/03/2024 0     Segmented % 06/03/2024 60     Immature Grans % 06/03/2024 1     Lymphocytes % 06/03/2024 26     Monocytes % 06/03/2024 8     Eosinophils Relative 06/03/2024 4     Basophils Relative 06/03/2024 1     Absolute Neutrophils 06/03/2024 3.69     Absolute Immature Grans 06/03/2024 0.04     Absolute Lymphocytes 06/03/2024 1.57     Absolute Monocytes 06/03/2024 0.45     Eosinophils Absolute 06/03/2024 0.21     Basophils Absolute 06/03/2024 0.07     Sodium 06/03/2024 139     Potassium 06/03/2024 4.8     Chloride 06/03/2024 103     CO2 06/03/2024 28     ANION GAP 06/03/2024 8     BUN 06/03/2024 11     Creatinine 06/03/2024 0.66     Glucose, Fasting  06/03/2024 104 (H)     Calcium 06/03/2024 9.1     AST 06/03/2024 19     ALT 06/03/2024 19     Alkaline Phosphatase 06/03/2024 50     Total Protein 06/03/2024 6.2 (L)     Albumin 06/03/2024 4.3     Total Bilirubin 06/03/2024 0.54     eGFR 06/03/2024 107     Cholesterol 06/03/2024 141     Triglycerides 06/03/2024 109     HDL, Direct 06/03/2024 66     LDL Calculated 06/03/2024 53     TSH 3RD GENERATON 06/03/2024 1.331    Admission on 04/10/2024, Discharged on 04/10/2024   Component Date Value    Amph/Meth UR 04/10/2024 Negative     Barbiturate Ur 04/10/2024 Positive (A)     Benzodiazepine Urine 04/10/2024 Negative     Cocaine Urine 04/10/2024 Negative     Methadone Urine 04/10/2024 Negative     Opiate Urine 04/10/2024 Negative     PCP Ur 04/10/2024 Negative     THC Urine 04/10/2024 Positive (A)     Oxycodone Urine 04/10/2024 Negative     Fentanyl Urine 04/10/2024 Negative     HYDROCODONE URINE 04/10/2024 Negative     EXTBreath Alcohol 04/10/2024 0.057     EXT Preg Test, Ur 04/10/2024 Negative     Control 04/10/2024 Valid        Substance Abuse History:  Social History     Substance and Sexual Activity   Drug Use No       Family Psychiatric History:   Family History   Problem Relation Age of Onset    Aortic stenosis Mother     Diabetes Mother     Hypertension Mother     Hyperlipidemia Mother     Diabetes Father     Hypertension Father     Hyperlipidemia Father     Heart murmur Brother     Hypertension Brother        The following portions of the patient's history were reviewed and updated as appropriate: allergies, current medications, past family history, past medical history, past social history, past surgical history, and problem list.    Social History     Socioeconomic History    Marital status: /Civil Union     Spouse name: Not on file    Number of children: 2    Years of education: some college    Highest education level: Not on file   Occupational History    Occupation: Unemployed   Tobacco Use    Smoking  status: Never    Smokeless tobacco: Never   Substance and Sexual Activity    Alcohol use: No    Drug use: No    Sexual activity: Yes     Partners: Male     Birth control/protection: Male Sterilization   Other Topics Concern    Not on file   Social History Narrative    Not on file     Social Determinants of Health     Financial Resource Strain: High Risk (9/28/2023)    Received from [facility]    Overall Financial Resource Strain (CARDIA)     Difficulty of Paying Living Expenses: Hard   Food Insecurity: Food Insecurity Present (9/28/2023)    Received from [facility]    Hunger Vital Sign     Worried About Running Out of Food in the Last Year: Sometimes true     Ran Out of Food in the Last Year: Sometimes true   Transportation Needs: No Transportation Needs (9/28/2023)    Received from [facility]    PRAPARE - Transportation     Lack of Transportation (Medical): No     Lack of Transportation (Non-Medical): No   Physical Activity: Not on file   Stress: Stress Concern Present (9/28/2023)    Received from [facility]    [employer] - Occupational Stress Questionnaire     Feeling of Stress : Very much   Social Connections: Moderately Isolated (9/28/2023)    Received from [facility]    Social Connection and Isolation Panel [NHANES]     Frequency of Communication with Friends and Family: More than three times a week     Frequency of Social Gatherings with Friends and Family: Once a week     Attends Mormon Services: Never     Active Member of Clubs or Organizations: No     Attends Club or Organization Meetings: Never     Marital Status:    Intimate Partner Violence: Not At Risk (9/28/2023)    Received from [facility]    Humiliation, Afraid, Rape, and Kick questionnaire     Fear of Current or Ex-Partner: No     Emotionally Abused: No     Physically Abused: No     Sexually Abused: No   Housing Stability: High Risk (9/28/2023)    [facility]    Housing Stability Vital Sign     Unable to Pay for Housing in the Last Year: Yes     Number of Places Lived in the Last Year: 1     Unstable Housing in the Last Year: No     Social History     Social History Narrative    Not on file       Objective:       Mental status:  Appearance calm and cooperative , adequate hygiene and grooming, and good eye contact    Mood dysphoric   Affect affect was constricted   Speech a normal rate and fluent   Thought Processes coherent/organized and normal thought processes   Hallucinations no hallucinations present    Thought Content no delusions   Abnormal Thoughts no suicidal thoughts  and no homicidal thoughts    Orientation  oriented to person and place and time   Remote Memory short term memory intact and long term memory intact   Attention Span concentration impaired   Intellect Appears to be of Average Intelligence   Insight Limited insight   Judgement judgment was limited   Muscle Strength N/a   Language no difficulty naming common objects and no difficulty repeating a phrase    Fund of Knowledge displays adequate knowledge of current events, adequate fund of knowledge regarding past history, and adequate fund of knowledge regarding vocabulary                Assessment/Plan:       Diagnoses and all orders for this visit:    Bipolar affective disorder, currently depressed, moderate (HCC)    AYALA (generalized anxiety disorder)  -     LORazepam (ATIVAN) 0.5 mg tablet; Take 1 tablet (0.5 mg total) by mouth every 8 (eight) hours as needed for anxiety    ADHD, predominantly hyperactive type    Other orders  -     transdermal buprenorphine (BUTRANS) 10 mcg/hr TD patch; apply 1 patch  topically to CLEAN, DRY, AND INTACT SKIN REMOVE AND...  (REFER TO PRESCRIPTION NOTES).  -     predniSONE 10 mg tablet; TAKE 4 TABLETS DAILY X3 DAYS, 3 TABS X3 DAYS, 2 TABS X3 DAYS, THEN 1 TAB X3 DAYS            Treatment Recommendations- Risks Benefits      Immediate Medical/Psychiatric/Psychotherapy Treatments and Any Precautions: continue current treatment     Risks, Benefits And Possible Side Effects Of Medications:  {PSYCH RISK, BENEFITS AND POSSIBLE SIDE EFFECTS (Optional):04117    Controlled Medication Discussion: Discussed with patient Black Box warning on concurrent use of benzodiazepines and opioid medications including sedation, respiratory depression, coma and death. Patient understands the risk of treatment with benzodiazepines in addition to opioids and wants to continue taking those medications. , Discussed with patient the risks of sedation, respiratory depression, impairment of ability to drive and potential for abuse and addiction related to treatment with benzodiazepine medications. The patient understands risk of treatment with benzodiazepine medications, agrees to not drive if feels impaired and agrees to take medications as prescribed., and The patient has been filling controlled prescriptions on time as prescribed to [place] Prescription Drug Monitoring program.      Psychotherapy Provided: No                              Visit Time    Visit Start Time: 10:30  Visit Stop Time: 11:00  Total Visit Duration:  30 minutes

## 2024-09-09 ENCOUNTER — TELEPHONE (OUTPATIENT)
Dept: PSYCHIATRY | Facility: CLINIC | Age: 44
End: 2024-09-09

## 2024-09-09 LAB
APOB+LDLR+PCSK9 GENE MUT ANL BLD/T: NOT DETECTED
BRCA1+BRCA2 DEL+DUP + FULL MUT ANL BLD/T: NOT DETECTED
MLH1+MSH2+MSH6+PMS2 GN DEL+DUP+FUL M: NOT DETECTED

## 2024-09-16 ENCOUNTER — TELEPHONE (OUTPATIENT)
Age: 44
End: 2024-09-16

## 2024-09-16 NOTE — TELEPHONE ENCOUNTER
Outreach call made in an attempt to schedule pt from [facility] wait list. Pt confirmed interest in services, scheduled 12/20/24 at 11:00 AM with Brice Laboy in [facility].

## 2024-09-25 DIAGNOSIS — F41.1 GAD (GENERALIZED ANXIETY DISORDER): ICD-10-CM

## 2024-09-25 DIAGNOSIS — F90.1 ADHD, PREDOMINANTLY HYPERACTIVE TYPE: ICD-10-CM

## 2024-09-25 RX ORDER — DEXTROAMPHETAMINE SACCHARATE, AMPHETAMINE ASPARTATE, DEXTROAMPHETAMINE SULFATE AND AMPHETAMINE SULFATE 3.75; 3.75; 3.75; 3.75 MG/1; MG/1; MG/1; MG/1
15 TABLET ORAL 2 TIMES DAILY
Qty: 60 TABLET | Refills: 0 | Status: SHIPPED | OUTPATIENT
Start: 2024-09-29

## 2024-09-25 RX ORDER — LORAZEPAM 0.5 MG/1
0.5 TABLET ORAL EVERY 8 HOURS PRN
Qty: 90 TABLET | Refills: 0 | Status: SHIPPED | OUTPATIENT
Start: 2024-10-05

## 2024-09-25 NOTE — TELEPHONE ENCOUNTER
Reason for call:   [x] Refill   [] Prior Auth  [] Other:     Office: [facility]  [] PCP/Provider -   [x] Specialty/Provider - Psychiatry/ Mandy Huang     Medication: adderall, lorazepam     Dose/Frequency: 15 mg, 0.5 mg/ twice daily, 1 tab every 8 hours PRN     Quantity: 30 day supply    Pharmacy: [facility] in [place]     Does the patient have enough for 3 days?   [] Yes   [x] No - Send as HP to POD

## 2024-09-25 NOTE — TELEPHONE ENCOUNTER
09/19/2024 09/18/2024 Belbuca (Film) 60.0 30 300 MCG NA [facility] [employer] [employer] [place] / 0 [place]      1 [id number] 09/14/2024 04/24/2024 Butalbital-acetaminophen-caffeine (Capsule) 50.0 16 300 MG-50 MG-40 MG NA [facility] [place] 2 / 3 [place]    1 [id number] 09/06/2024 09/05/2024 Buprenorphine (Patch, Extended Release) 4.0 28 20 MCG/1 HR NA [employer] [place] / 0 [place]    1 [id number] 09/05/2024 09/05/2024 LORazepam (Tablet) 90.0 30 0.5 MG NA CHASE MALIN [facility] [place] / 0 [place]    1 [id number] 08/29/2024 08/29/2024 Amphetamine Salt Combo (Tablet) 60.0 30 15 MG NA CHASE MALIN [facility] [place] / 0 [place]    1 [id number] 08/26/2024 08/26/2024 HYDROcodone POLISTIREX-CHLORPHENIRA (Suspension, Extended Release) 120.0 12 8 MG/5 ML-10 MG/5 ML 20.0 [facility] [employer] [facility] [place] / 0 [place]    1 [id number] 08/21/2024 08/21/2024 oxyCODONE HCL (Tablet) 45.0 15 5 MG 22.50 [employer] [place] / 0 [place]    1 [id number] 08/21/2024 08/21/2024 Buprenorphine (Patch, Extended Release) 4.0 28 10 MCG/1 HR NA [employer] [place] / 0 [place]    1 [id number] 08/06/2024 08/06/2024 LORazepam (Tablet) 90.0 30 0.5 MG NA CHASE MALIN [employer] [facility] Commercial [place] / 0 [place]    1 [id number] 08/06/2024 08/06/2024 Amphetamine Salt Combo (Tablet) 60.0 30 10 MG NA CHASE MALIN [facility]

## 2024-10-02 ENCOUNTER — TELEPHONE (OUTPATIENT)
Age: 44
End: 2024-10-02

## 2024-10-02 DIAGNOSIS — F90.1 ADHD, PREDOMINANTLY HYPERACTIVE TYPE: ICD-10-CM

## 2024-10-02 RX ORDER — DEXTROAMPHETAMINE SACCHARATE, AMPHETAMINE ASPARTATE, DEXTROAMPHETAMINE SULFATE AND AMPHETAMINE SULFATE 3.75; 3.75; 3.75; 3.75 MG/1; MG/1; MG/1; MG/1
15 TABLET ORAL 2 TIMES DAILY
Qty: 60 TABLET | Refills: 0 | Status: SHIPPED | OUTPATIENT
Start: 2024-10-02

## 2024-10-02 NOTE — TELEPHONE ENCOUNTER
Patient called in regard to Adderall medication. Patient is requesting the medication refill be sent to [facility] pharmacy in [place] on file. Patient does not wish medication to go to [facility]. Writer confirmed refill was sent 9/25 to [facility].   Patient requested it go to [facility] as soon as possible as she is out of the medication.

## 2024-10-25 DIAGNOSIS — F31.62 BIPOLAR DISORDER, CURRENT EPISODE MIXED, MODERATE (HCC): ICD-10-CM

## 2024-10-25 RX ORDER — ARIPIPRAZOLE 10 MG/1
10 TABLET ORAL DAILY
Qty: 30 TABLET | Refills: 2 | Status: SHIPPED | OUTPATIENT
Start: 2024-10-25

## 2024-10-28 DIAGNOSIS — F41.1 GAD (GENERALIZED ANXIETY DISORDER): ICD-10-CM

## 2024-10-28 DIAGNOSIS — F90.1 ADHD, PREDOMINANTLY HYPERACTIVE TYPE: ICD-10-CM

## 2024-10-28 DIAGNOSIS — F31.62 BIPOLAR DISORDER, CURRENT EPISODE MIXED, MODERATE (HCC): ICD-10-CM

## 2024-10-28 RX ORDER — DEXTROAMPHETAMINE SACCHARATE, AMPHETAMINE ASPARTATE, DEXTROAMPHETAMINE SULFATE AND AMPHETAMINE SULFATE 3.75; 3.75; 3.75; 3.75 MG/1; MG/1; MG/1; MG/1
15 TABLET ORAL 2 TIMES DAILY
Qty: 60 TABLET | Refills: 0 | OUTPATIENT
Start: 2024-10-28

## 2024-10-28 RX ORDER — LORAZEPAM 0.5 MG/1
0.5 TABLET ORAL EVERY 8 HOURS PRN
Qty: 90 TABLET | Refills: 0 | OUTPATIENT
Start: 2024-10-28

## 2024-10-28 RX ORDER — DEXTROAMPHETAMINE SACCHARATE, AMPHETAMINE ASPARTATE, DEXTROAMPHETAMINE SULFATE AND AMPHETAMINE SULFATE 3.75; 3.75; 3.75; 3.75 MG/1; MG/1; MG/1; MG/1
15 TABLET ORAL 2 TIMES DAILY
Qty: 60 TABLET | Refills: 0 | Status: SHIPPED | OUTPATIENT
Start: 2024-10-28

## 2024-10-28 RX ORDER — LORAZEPAM 0.5 MG/1
0.5 TABLET ORAL EVERY 8 HOURS PRN
Qty: 90 TABLET | Refills: 0 | Status: SHIPPED | OUTPATIENT
Start: 2024-10-28

## 2024-10-28 RX ORDER — ARIPIPRAZOLE 10 MG/1
10 TABLET ORAL DAILY
Qty: 30 TABLET | Refills: 0 | OUTPATIENT
Start: 2024-10-28

## 2024-10-28 NOTE — TELEPHONE ENCOUNTER
Reason for call:   [x] Refill   [] Prior Auth  [x] Other: Not a duplicate - needs resent to correct pharmacy     Office:   [] PCP/Provider -   [x] Specialty/Provider - [facility]     Medication: amphetamine-dextroamphetamine (ADDERALL, 15MG,) 15 MG Take 1 tablet (15 mg total) by mouth 2 (two) times a day     ARIPiprazole (ABILIFY) 10 mg TAKE 1 TABLET BY MOUTH EVERY DAY     LORazepam (ATIVAN) 0.5 mg Take 1 tablet (0.5 mg total) by mouth every 8 (eight) hours as needed for anxiety       Pharmacy: [facility] [address]    Does the patient have enough for 3 days?   [] Yes   [x] No - Send as HP to POD

## 2024-11-26 DIAGNOSIS — F41.1 GAD (GENERALIZED ANXIETY DISORDER): ICD-10-CM

## 2024-11-26 DIAGNOSIS — F90.1 ADHD, PREDOMINANTLY HYPERACTIVE TYPE: ICD-10-CM

## 2024-11-26 RX ORDER — LORAZEPAM 0.5 MG/1
0.5 TABLET ORAL EVERY 8 HOURS PRN
Qty: 90 TABLET | Refills: 0 | Status: SHIPPED | OUTPATIENT
Start: 2024-11-26

## 2024-11-26 RX ORDER — DEXTROAMPHETAMINE SACCHARATE, AMPHETAMINE ASPARTATE, DEXTROAMPHETAMINE SULFATE AND AMPHETAMINE SULFATE 3.75; 3.75; 3.75; 3.75 MG/1; MG/1; MG/1; MG/1
15 TABLET ORAL 2 TIMES DAILY
Qty: 60 TABLET | Refills: 0 | Status: SHIPPED | OUTPATIENT
Start: 2024-11-26

## 2024-11-26 NOTE — TELEPHONE ENCOUNTER
Reason for call:   [x] Refill   [] Prior Auth  [] Other:     Office:   [x] PCP/Provider -   [] Specialty/Provider -     ATIVAN - 0.5 MG  ADDERALL 15 MG    Pharmacy:   [id number] [place] [address] [address]  Phone: [phone]  Fax: [phone]     Does the patient have enough for 3 days?   [x] Yes   [] No - Send as HP to POD

## 2024-11-26 NOTE — TELEPHONE ENCOUNTER
10/31/2024 10/28/2024 Amphetamine Salt Combo (Tablet) 60.0 30 15 MG NA [facility] [employer] [place] / 0 [place]   1 [id number] 10/28/2024 10/28/2024 LORazepam (Tablet) 90.0 30 0.5 MG NA CHASE MALIN [facility] [place] / [place]   1 [id number] 10/09/2024 10/09/2024 HYDROcodone POLISTIREX-CHLORPHENIRA (Suspension, Extended Release) 115.0 11 8 MG/5 ML-10 MG/5 ML 20.91 [employer] [place] / 0 [place]   1 [id number] 10/03/2024 10/02/2024 Amphetamine Salt Combo (Tablet) 60.0 30 15 MG NA CHASE GARZACALE [facility] [place] / [place]   1 [id number] 10/03/2024 04/24/2024 Butalbital-acetaminophen-caffeine (Capsule) 50.0 16 300 MG-50 MG-40 MG NA [email or web address] [facility] [place] 3 / 3 [place]   1 [id number] 10/02/2024 09/25/2024 LORazepam (Tablet) 90.0 30 0.5 MG NA CHASE GARZACALE [facility] Commercial [place] 0 / 0 [place]   1 [id number] 09/29/2024 09/25/2024 Amphetamine Salt Combo (Tablet) 10.0 5 15 MG NA CHASE GARZACALE [facility] Commercial [place] 0 / 0 [place]   1 [id number] 09/19/2024 09/18/2024 Belbuca (Film) 60.0 30 300 MCG NA [facility]UL[facility] [place] / [place]   1 [id number] 09/14/2024 04/24/2024 Butalbital-acetaminophen-caffeine (Capsule) 50.0 16 300 MG-50 MG-40 MG NA [email or web address] [facility]

## 2024-12-04 ENCOUNTER — TELEMEDICINE (OUTPATIENT)
Dept: PSYCHIATRY | Facility: CLINIC | Age: 44
End: 2024-12-04
Payer: COMMERCIAL

## 2024-12-04 DIAGNOSIS — F90.1 ADHD, PREDOMINANTLY HYPERACTIVE TYPE: ICD-10-CM

## 2024-12-04 DIAGNOSIS — F31.32 BIPOLAR AFFECTIVE DISORDER, CURRENTLY DEPRESSED, MODERATE (HCC): Primary | ICD-10-CM

## 2024-12-04 DIAGNOSIS — F41.1 GAD (GENERALIZED ANXIETY DISORDER): ICD-10-CM

## 2024-12-04 PROCEDURE — 99214 OFFICE O/P EST MOD 30 MIN: CPT | Performed by: PSYCHIATRY & NEUROLOGY

## 2024-12-04 RX ORDER — BUPRENORPHINE 20 UG/H
PATCH TRANSDERMAL
COMMUNITY
Start: 2024-09-06

## 2024-12-04 RX ORDER — BUPRENORPHINE HYDROCHLORIDE 300 UG/1
FILM, SOLUBLE BUCCAL
COMMUNITY
Start: 2024-09-19

## 2024-12-04 RX ORDER — TIZANIDINE 2 MG/1
TABLET ORAL
COMMUNITY
Start: 2024-11-21

## 2024-12-04 NOTE — BH TREATMENT PLAN
TREATMENT PLAN (Medication Management Only)        [facility]    Name and Date of Birth:  Lorna Antunez 44 y.o. 1980  Date of Treatment Plan: December 4, 2024  Diagnosis/Diagnoses:    1. Bipolar affective disorder, currently depressed, moderate (HCC)    2. AYALA (generalized anxiety disorder)    3. ADHD, predominantly hyperactive type      Strengths/Personal Resources for Self-Care: taking medications as prescribed, ability to communicate needs.  Area/Areas of need (in own words): mood instability  1. Long Term Goal: continue improvement in mood stability.  Target Date:6 months - 6/4/2025  Person/Persons responsible for completion of goal: Lorna  2.  Short Term Objective (s) - How will we reach this goal?:   A. Provider new recommended medication/dosage changes and/or continue medication(s): continue current medications as prescribed.  B. N/A.  C. N/A.  Target Date:6 months - 6/4/2025  Person/Persons Responsible for Completion of Goal: Lorna  Progress Towards Goals: continuing treatment  Treatment Modality: medication management every 6 months  Review due 180 days from date of this plan: 6 months - 6/4/2025  Expected length of service: ongoing treatment  My Physician/PA/NP and I have developed this plan together and I agree to work on the goals and objectives. I understand the treatment goals that were developed for my treatment.

## 2024-12-04 NOTE — PSYCH
Virtual Regular Visit    Verification of patient location:    Patient is located at Home in the following state in which I hold an active license [place]      Assessment/Plan:    Problem List Items Addressed This Visit          Behavioral Health    ADHD, predominantly hyperactive type    Bipolar affective disorder, currently depressed, moderate (HCC) - Primary    AYALA (generalized anxiety disorder)                  Reason for visit is   No chief complaint on file.         Encounter provider Mandy Huang MD      Recent Visits  No visits were found meeting these conditions.  Showing recent visits within past 7 days and meeting all other requirements  Future Appointments  No visits were found meeting these conditions.  Showing future appointments within next 150 days and meeting all other requirements       The patient was identified by name and date of birth. Lorna Antunez was informed that this is a telemedicine visit and that the visit is being conducted throughthe Epic Embedded platform. She agrees to proceed..  My office door was closed. No one else was in the room.  She acknowledged consent and understanding of privacy and security of the video platform. The patient has agreed to participate and understands they can discontinue the visit at any time.    Patient is aware this is a billable service.     Subjective  Lorna Antunez is a 44 y.o. female  Bipolar do and ADHD  . Since last seen her l mood has been stable and sleep has improved.   Pain management changed 5 mg oxycodone to buprenorphine patch and the dose is being titrated.   Currently on Abilify, Lorazepam and Adderall IR 15 mg po bid after last dose adjustment.  Symptoms improved and feeling overall better than she has in a long time.  No significant side effects reported.    Patient stated that she has been anxious and instead of using Lorazepam only prn she has been using it daily.   She reported multiple stressors related to her  [facility].  She is also scheduled to to start individual counseling Dec 20th.  She stated the last time she helped her mother her dad had a fall,he was also diagnosed with Parkinson's.  Agrees to continue current treatment and will schedule follow up in 3 months or sooner if needed.          HPI     Past Medical History:   Diagnosis Date    Hip dysplasia     last assessed: 9/15/15    Insulin controlled gestational diabetes mellitus in third trimester     last assessed: 9/23/15       Past Surgical History:   Procedure Laterality Date    CARPAL TUNNEL RELEASE Bilateral     FL GUIDED NEEDLE PLAC BX/ASP/INJ  9/10/2018    FL GUIDED NEEDLE PLAC BX/ASP/INJ  12/5/2018    FL GUIDED NEEDLE PLAC BX/ASP/INJ  12/27/2019    LAPAROSCOPIC CHOLECYSTECTOMY      LAILA-EN-Y PROCEDURE      TOOTH EXTRACTION      TOTAL HIP ARTHROPLASTY Left     TOTAL HIP ARTHROPLASTY Right     revision March 2023    TRIGGER FINGER RELEASE Right        Current Outpatient Medications   Medication Sig Dispense Refill    Belbuca 300 MCG FILM place 1 FILM BUCCALLY ( BETWEEN CHEEK AND GUM ) every 12 hours      tiZANidine (ZANAFLEX) 2 mg tablet take 2 tablets by mouth every 6 hours if needed for muscle spasm      transdermal buprenorphine (BUTRANS) 20 mcg/hr PTWK TD patch apply 1 patch topically to CLEAN, DRY, AND INTACT SKIN REMOVE AND REPLACE every 7 days      acetaminophen (TYLENOL) 325 mg tablet Take by mouth      albuterol (ACCUNEB) 0.63 MG/3ML nebulizer solution Inhale 0.63 mg Three times a day      albuterol (PROVENTIL HFA,VENTOLIN HFA) 90 mcg/act inhaler Inhale 2 puffs every 6 (six) hours      amLODIPine (NORVASC) 5 mg tablet Take 5 mg by mouth daily      amphetamine-dextroamphetamine (ADDERALL, 15MG,) 15 MG tablet Take 1 tablet (15 mg total) by mouth 2 (two) times a day Max Daily Amount: 30 mg 60 tablet 0    ARIPiprazole (ABILIFY) 10 mg tablet TAKE 1 TABLET BY MOUTH EVERY DAY 30 tablet 2    B Complex-C (SURBEX-T PO) Take 1 tablet by mouth every  morning      benzonatate (TESSALON) 200 MG capsule       Butalbital-APAP-Caffeine -40 MG CAPS Take 1 capsule by mouth Three times a day      cetirizine (ZyrTEC) 10 mg tablet Take 10 mg by mouth daily      Cholecalciferol (VITAMIN D3) 5000 units CHEW Chew 5,000 Units      diphenhydrAMINE (BENADRYL) 50 MG tablet Take 50 mg by mouth every 6 (six) hours      Dulera 200-5 MCG/ACT inhaler       EPINEPHrine (EPIPEN) 0.3 mg/0.3 mL SOAJ Inject 0.3 mg into the shoulder, thigh, or buttocks      gabapentin (NEURONTIN) 300 mg capsule TAKE 1 CAPSULE BY MOUTH EVERY 4 HOURS      ketoconazole (NIZORAL) 2 % cream APPLY TOPICALLY 2 TIMES A DAY AS NEEDED FOR RASH.      lansoprazole (PREVACID) 30 mg capsule       lidocaine (LIDODERM) 5 % APPLY 1 TO 3 PATCHES TO AFFECTED AREA FOR UP TO 12 HOURS DAILY THEN REMOVE FOR 12 HOURS.      lisinopril (ZESTRIL) 40 mg tablet Take 40 mg by mouth      LORazepam (ATIVAN) 0.5 mg tablet Take 1 tablet (0.5 mg total) by mouth every 8 (eight) hours as needed for anxiety 90 tablet 0    melatonin 3 mg Take 3 mg by mouth      Mometasone Furo-Formoterol Fum (DULERA) 100-5 MCG/ACT AERO Inhale      omeprazole (PriLOSEC) 20 mg delayed release capsule Take 20 mg by mouth daily      omeprazole (PriLOSEC) 40 MG capsule TAKE ONE CAPSULE BY MOUTH IN THE MORNING      ondansetron (ZOFRAN-ODT) 8 mg disintegrating tablet DISSOLVE 1 TABLET (8 MG TOTAL) IN CHEEK EVERY 8 (EIGHT) HOURS AS NEEDED FOR NAUSEA OR VOMITING.      patient supplied medication Take 1 Dose by mouth      rosuvastatin (CRESTOR) 10 MG tablet take 1 tablet by mouth every day at night      tiZANidine (ZANAFLEX) 4 mg tablet take 1 tablet by mouth twice a day if needed for pain or SPASM(S)       No current facility-administered medications for this visit.        Allergies   Allergen Reactions    Other Hives, Other (See Comments) and Swelling     Will trigger asthma    Montelukast Anxiety     anxiety  anxiety       Review of Systems     Mood Anxiety and  Emotional Lability   Behavior Impulsive Behavior   Thought Content Disturbing Thoughts, Feelings   General Emotional Problems, Sleep Disturbances, and Decreased Functioning   Personality Change in Personality   Other Psych Symptoms Normal   Constitutional Negative   ENT Negative   Cardiovascular Negative   Respiratory Negative   Gastrointestinal Negative   Genitourinary Negative   Musculoskeletal Negative   Integumentary Negative   Neurological Negative   Endocrine Normal    Other Symptoms Normal        Laboratory Results: Recent Labs (last 6 months):   No visits with results within 6 Month(s) from this visit.   Latest known visit with results is:   Appointment on 06/03/2024   Component Date Value    VAUGHAN SYNDROME DNA ELA* 06/03/2024 Not Detected     HEREDITARY BREAST & OVAR* 06/03/2024 Not Detected     FAMILIAL HYPERCHOLESTERO* 06/03/2024 Not Detected        Substance Abuse History:  Social History     Substance and Sexual Activity   Drug Use No       Family Psychiatric History:   Family History   Problem Relation Age of Onset    Aortic stenosis Mother     Diabetes Mother     Hypertension Mother     Hyperlipidemia Mother     Diabetes Father     Hypertension Father     Hyperlipidemia Father     Heart murmur Brother     Hypertension Brother        The following portions of the patient's history were reviewed and updated as appropriate: allergies, current medications, past family history, past medical history, past social history, past surgical history, and problem list.    Social History     Socioeconomic History    Marital status: /Civil Union     Spouse name: Not on file    Number of children: 2    Years of education: some college    Highest education level: Not on file   Occupational History    Occupation: Unemployed   Tobacco Use    Smoking status: Never    Smokeless tobacco: Never   Substance and Sexual Activity    Alcohol use: No    Drug use: No    Sexual activity: Yes     Partners: Male     Birth  control/protection: Male Sterilization   Other Topics Concern    Not on file   Social History Narrative    Not on file     Social Drivers of Health     Financial Resource Strain: High Risk (11/6/2024)    Received from [facility]    Overall Financial Resource Strain (CARDIA)     Difficulty of Paying Living Expenses: Hard   Food Insecurity: Food Insecurity Present (11/6/2024)    Received from [facility]    Hunger Vital Sign     Worried About Running Out of Food in the Last Year: Sometimes true     Ran Out of Food in the Last Year: Sometimes true   Transportation Needs: No Transportation Needs (11/6/2024)    Received from [facility]    PRAPARE - Transportation     Lack of Transportation (Medical): No     Lack of Transportation (Non-Medical): No   Physical Activity: Not on file   Stress: Stress Concern Present (11/6/2024)    Received from [facility]    [employer] [employer] - Occupational Stress Questionnaire     Feeling of Stress : Rather much   Social Connections: Feeling Somewhat Isolated (11/6/2024)    Received from [facility]    OASIS : Social Isolation     How often do you feel lonely or isolated from those around you?: Sometimes   Intimate Partner Violence: Not At Risk (11/6/2024)    Received from [facility]    Humiliation, Afraid, Rape, and Kick questionnaire     Fear of Current or Ex-Partner: No     Emotionally Abused: No     Physically Abused: No     Sexually Abused: No   Housing Stability: High Risk (11/6/2024)    Received from [facility]    Housing Stability Vital Sign     Unable to Pay for Housing in the Last Year: Yes     Number of Times Moved in the Last Year: 0     Homeless in the Last Year: No     Social History     Social History Narrative    Not on file       Objective:       Mental status:  Appearance calm and cooperative , adequate hygiene and  grooming, and good eye contact    Mood dysphoric   Affect affect was constricted   Speech a normal rate and fluent   Thought Processes coherent/organized and normal thought processes   Hallucinations no hallucinations present    Thought Content no delusions   Abnormal Thoughts no suicidal thoughts  and no homicidal thoughts    Orientation  oriented to person and place and time   Remote Memory short term memory intact and long term memory intact   Attention Span concentration impaired   Intellect Appears to be of Average Intelligence   Insight Limited insight   Judgement judgment was limited   Muscle Strength N/a   Language no difficulty naming common objects and no difficulty repeating a phrase    Fund of Knowledge displays adequate knowledge of current events, adequate fund of knowledge regarding past history, and adequate fund of knowledge regarding vocabulary                Assessment/Plan:       Diagnoses and all orders for this visit:    Bipolar affective disorder, currently depressed, moderate (HCC)    AYALA (generalized anxiety disorder)    ADHD, predominantly hyperactive type    Other orders  -     transdermal buprenorphine (BUTRANS) 20 mcg/hr PTWK TD patch; apply 1 patch topically to CLEAN, DRY, AND INTACT SKIN REMOVE AND REPLACE every 7 days  -     Belbuca 300 MCG FILM; place 1 FILM BUCCALLY ( BETWEEN CHEEK AND GUM ) every 12 hours  -     tiZANidine (ZANAFLEX) 2 mg tablet; take 2 tablets by mouth every 6 hours if needed for muscle spasm          Assessment & Plan  Bipolar affective disorder, currently depressed, moderate (HCC)         AYALA (generalized anxiety disorder)         ADHD, predominantly hyperactive type              Treatment Recommendations- Risks Benefits      Immediate Medical/Psychiatric/Psychotherapy Treatments and Any Precautions: continue current treatment     Risks, Benefits And Possible Side Effects Of Medications:  {PSYCH RISK, BENEFITS AND POSSIBLE SIDE EFFECTS  (Optional):64211    Controlled Medication Discussion: Discussed with patient Black Box warning on concurrent use of benzodiazepines and opioid medications including sedation, respiratory depression, coma and death. Patient understands the risk of treatment with benzodiazepines in addition to opioids and wants to continue taking those medications. , Discussed with patient the risks of sedation, respiratory depression, impairment of ability to drive and potential for abuse and addiction related to treatment with benzodiazepine medications. The patient understands risk of treatment with benzodiazepine medications, agrees to not drive if feels impaired and agrees to take medications as prescribed., and The patient has been filling controlled prescriptions on time as prescribed to [place] Prescription Drug Monitoring program.      Psychotherapy Provided: No                              Visit Time    Visit Start Time: 2:30  Visit Stop Time: 2:50  Total Visit Duration:  20 minutes

## 2024-12-05 ENCOUNTER — TELEPHONE (OUTPATIENT)
Dept: PSYCHIATRY | Facility: CLINIC | Age: 44
End: 2024-12-05

## 2024-12-20 ENCOUNTER — OFFICE VISIT (OUTPATIENT)
Dept: BEHAVIORAL/MENTAL HEALTH CLINIC | Facility: CLINIC | Age: 44
End: 2024-12-20
Payer: COMMERCIAL

## 2024-12-20 DIAGNOSIS — F31.30 BIPOLAR AFFECTIVE DISORDER, CURRENT EPISODE DEPRESSED, CURRENT EPISODE SEVERITY UNSPECIFIED (HCC): Primary | ICD-10-CM

## 2024-12-20 PROCEDURE — 90791 PSYCH DIAGNOSTIC EVALUATION: CPT | Performed by: COUNSELOR

## 2024-12-20 NOTE — PSYCH
Behavioral Health Psychotherapy Assessment    Date of Initial Psychotherapy Assessment: 12/20/24  Referral Source: self  Has a release of information been signed for the referral source? NA    Preferred Name: Марина Antunez  Preferred Pronouns: She/her  YOB: 1980 Age: 44 y.o.  Sex assigned at birth: female   Gender Identity: female  Race:   Preferred Language: English    Emergency Contact:  Full Name: Marek  Relationship to Client: spouse  Contact information: in cell    Primary Care Physician:  Melita Luz MD  [address] [facility] [address] [phone]  Has a release of information been signed? Yes    Physical Health History:  Past surgical procedures: bi-lateral hip replacement  Do you have a history of any of the following: N/A  Do you have any mobility issues? No    Relevant Family History:  N/a    Presenting Problem (What brings you in?)  Depression due to medical issues  Bi-lateral hip  Asthma  Unable to work    Mental Health Advance Directive:  Do you currently have a Mental Health Advance Directive?no    Diagnosis:  Pending    Initial Assessment:     Current Mental Status:    Appearance: appropriate      Behavior/Manner: cooperative      Affect/Mood:  Good    Speech:  Normal    Sleep:  Interrupted    Oriented to: oriented to self, oriented to place and oriented to time       Clinical Symptoms    Depression: yes      Depression Symptoms: depressed mood, suicidal ideation, fatigue and sleep disturbance      Have you ever been assaultive to others or the environment: No      Have you ever been self-injurious: No      Counseling History:  Previous Counseling or Treatment  (Mental Health or Drug & Alcohol): No    Have you previously taken psychiatric medications: No      Suicide Risk Assessment  Have you ever had a suicide attempt: No    Have you had incidents of suicidal ideation: No    Are you currently experiencing suicidal thoughts: No      Substance  Abuse/Addiction Assessment:  Alcohol: No    Fentanyl: No    Opiates: No    Cocaine: No    Amphetamines: No    Hallucinogens: No    Club Drugs: No    Benzodiazepines: No    Other Rx Meds: No    Marijuana: No    Tobacco/Nicotine: No    Have you experienced blackouts as a result of substance use: No    Have you had any periods of abstinence: No    Have you experienced symptoms of withdrawal: No    Have you ever overdosed on any substances?: No    Are you currently using any Medication Assisted Treatment for Substance Use: No      Disordered Eating History:  Do you have a history of disordered eating: No      Social Determinants of Health:    SDOH:  Medical cost barrier    Legal History:    Any pending legal charges: No      Relationship History:    Current marital status:       Natural Supports:  Other    Employment History    Are you currently employed: No      Sources of income/financial support:  Family members and Social Security Disability (SSDI)     History:      Status: no history of  duty  Educational History:     Have you ever been diagnosed with a learning disability: No      Highest level of education:  Some college    Have you ever had an IEP or 504-plan: No      Do you need assistance with reading or writing: No      Recommended Treatment:     Psychotherapy:  Individual sessions    Frequency:  2 times    Session frequency:  Monthly      Visit start and stop times:    12/20/24

## 2024-12-24 DIAGNOSIS — F41.1 GAD (GENERALIZED ANXIETY DISORDER): ICD-10-CM

## 2024-12-24 DIAGNOSIS — F90.1 ADHD, PREDOMINANTLY HYPERACTIVE TYPE: ICD-10-CM

## 2024-12-24 RX ORDER — LORAZEPAM 0.5 MG/1
0.5 TABLET ORAL EVERY 8 HOURS PRN
Qty: 90 TABLET | Refills: 0 | Status: SHIPPED | OUTPATIENT
Start: 2024-12-24

## 2024-12-24 RX ORDER — DEXTROAMPHETAMINE SACCHARATE, AMPHETAMINE ASPARTATE, DEXTROAMPHETAMINE SULFATE AND AMPHETAMINE SULFATE 3.75; 3.75; 3.75; 3.75 MG/1; MG/1; MG/1; MG/1
15 TABLET ORAL 2 TIMES DAILY
Qty: 60 TABLET | Refills: 0 | Status: SHIPPED | OUTPATIENT
Start: 2024-12-24

## 2024-12-24 NOTE — TELEPHONE ENCOUNTER
12/19/2024 12/19/2024 Butalbital-acetaminophen-caffeine (Capsule) 50.0 8 300 MG-50 MG-40 MG NA [facility] [place] / 3 [place]   1 [id number] 12/09/2024 12/09/2024 HYDROcodone POLISTIREX-CHLORPHENIRA (Suspension, Extended Release) 115.0 11 8 MG/5 ML-10 MG/5 ML 20.91 [facility] [place] / 0 [place]   1 [id number] 11/29/2024 11/26/2024 Amphetamine Salt Combo (Tablet) 60.0 30 15 MG NA [facility] [place] / 0 [place]   1 [id number] 11/27/2024 11/26/2024 LORazepam (Tablet) 90.0 30 0.5 MG NA [facility] [place] / 0 [place]   1 [id number] 10/31/2024 10/28/2024 Amphetamine Salt Combo (Tablet) 60.0 30 15 MG NA [facility] [place]   1 [id number] 10/28/2024 10/28/2024 LORazepam (Tablet) 90.0 30 0.5 MG NA [facility] [place] / 0 [place]   1 [id number] 10/09/2024 10/09/2024 HYDROcodone POLISTIREX-CHLORPHENIRA (Suspension, Extended Release) 115.0 11 8 MG/5 ML-10 MG/5 ML 20.91 [facility] Commercial [place] / 0 [place]   1 [id number] 10/03/2024 10/02/2024 Amphetamine Salt Combo (Tablet) 60.0 30 15 MG NA [facility] [place] / 0 [place]   1 [id number] 10/03/2024 04/24/2024 Butalbital-acetaminophen-caffeine (Capsule)

## 2024-12-24 NOTE — TELEPHONE ENCOUNTER
Reason for call:   [x] Refill   [] Prior Auth  [] Other:     Office:   [] PCP/Provider -   [x] Specialty/Provider - Mandy Huang MD     Medication: (ATIVAN) 0.5 mg / 1 tab every 8 hrs / 90 tabs                      ADDERALL, 15MG,) / 1 tab BID / 60 tabs      Pharmacy: [id number] - [place] [address]    Does the patient have enough for 3 days?   [x] Yes   [] No - Send as HP to POD

## 2025-01-21 DIAGNOSIS — F41.1 GAD (GENERALIZED ANXIETY DISORDER): ICD-10-CM

## 2025-01-21 DIAGNOSIS — F90.1 ADHD, PREDOMINANTLY HYPERACTIVE TYPE: ICD-10-CM

## 2025-01-21 RX ORDER — DEXTROAMPHETAMINE SACCHARATE, AMPHETAMINE ASPARTATE, DEXTROAMPHETAMINE SULFATE AND AMPHETAMINE SULFATE 3.75; 3.75; 3.75; 3.75 MG/1; MG/1; MG/1; MG/1
15 TABLET ORAL 2 TIMES DAILY
Qty: 60 TABLET | Refills: 0 | Status: SHIPPED | OUTPATIENT
Start: 2025-01-21

## 2025-01-21 RX ORDER — LORAZEPAM 0.5 MG/1
0.5 TABLET ORAL EVERY 8 HOURS PRN
Qty: 90 TABLET | Refills: 0 | Status: SHIPPED | OUTPATIENT
Start: 2025-01-21

## 2025-01-21 NOTE — TELEPHONE ENCOUNTER
Reason for call:   [x] Refill   [] Prior Auth  [] Other:     Office:   [] PCP/Provider -   [x] Specialty/Provider - [facility]     Medication: amphetamine-dextroamphetamine (ADDERALL, 15MG,) 15 MG tablet     Dose/Frequency: 60Take 1 tablet (15 mg total) by mouth 2 (two) times a day     Quantity: 60    Pharmacy: [id number] [place] [address]      Does the patient have enough for 3 days?   [] Yes   [x] No - Send as HP to POD    Reason for call:   [x] Refill   [] Prior Auth  [] Other:     Office:   [] PCP/Provider -   [x] Specialty/Provider - [facility]     Medication:  LORazepam (ATIVAN) 0.5 mg tablet    Dose/Frequency: ramez 1 tablet (0.5 mg total) by mouth every 8 (eight) hours as needed for anxiety,     Quantity: 90    Pharmacy: [id number] [place] [address]      Does the patient have enough for 3 days?   [] Yes   [x] No - Send as HP to POD

## 2025-01-21 NOTE — TELEPHONE ENCOUNTER
01/09/2025 01/09/2025 oxyCODONE HYDROCHLORIDE 5 MG ORAL TABLET/ACETAMINOPHEN 325 MG (Tablet) 20.0 6 325 MG-5 MG 25.0 [facility] [place] / 0 [place]   2 [id number] 12/27/2024 12/24/2024 Amphetamine Salt Combo (Tablet) 60.0 30 15 MG NA [facility] [place] / 0 [place]   2 [id number] 12/26/2024 12/24/2024 LORazepam (Tablet) 90.0 30 0.5 MG NA [facility] [place] / 0 [place]   2 [id number] 12/19/2024 12/19/2024 Butalbital-acetaminophen-caffeine (Capsule) 50.0 8 300 MG-50 MG-40 MG NA [facility] [place] / 3 [place]   2 [id number] 12/09/2024 12/09/2024 HYDROcodone POLISTIREX-CHLORPHENIRA (Suspension, Extended Release) 115.0 11 8 MG/5 ML-10 MG/5 ML 20.91 [facility] Commercial [place] / 0 [place]   2 [id number] 11/29/2024 11/26/2024 Amphetamine Salt Combo (Tablet) 60.0 30 15 MG NA [facility] Commercial [place] / 0 [place]   2 [id number] 11/27/2024 11/26/2024 LORazepam (Tablet) 90.0 30 0.5 MG NA [facility] Commercial [place] / 0 [place]   2 [id number] 10/31/2024 10/28/2024 Amphetamine Salt Combo (Tablet) 60.0 30 15 MG NA [facility] Commercial [place] / 0 [place]   2 [id number] 10/28/2024 10/28/2024 LORazepam (Tablet) 90.0 30 0.5 MG NA [facility] Commercial [place] / 0 [place]   2 [id number] 10/09/2024 10/09/2024 HYDROcodone POLISTIREX-CHLORPHENIR

## 2025-01-28 ENCOUNTER — SOCIAL WORK (OUTPATIENT)
Dept: BEHAVIORAL/MENTAL HEALTH CLINIC | Facility: CLINIC | Age: 45
End: 2025-01-28
Payer: COMMERCIAL

## 2025-01-28 DIAGNOSIS — F41.1 GAD (GENERALIZED ANXIETY DISORDER): Primary | ICD-10-CM

## 2025-01-28 DIAGNOSIS — F31.32 BIPOLAR AFFECTIVE DISORDER, CURRENTLY DEPRESSED, MODERATE (HCC): ICD-10-CM

## 2025-01-28 PROCEDURE — 90834 PSYTX W PT 45 MINUTES: CPT | Performed by: COUNSELOR

## 2025-01-28 NOTE — PSYCH
"Behavioral Health Psychotherapy Progress Note    Psychotherapy Provided: Individual Psychotherapy     1. AYALA (generalized anxiety disorder)        2. Bipolar affective disorder, currently depressed, moderate (HCC)            Goals addressed in session: Goal 1     DATA: Met with Carly and explored ideas for her to accept that she cannot help in every aspect of her life with her parents and exercising boundaries while caring for herself. She is going to explore taking tasks in bite size chucks so as to not be overwhelmed  During this session, this clinician used the following therapeutic modalities: Client-centered Therapy    Substance Abuse was not addressed during this session. If the client is diagnosed with a co-occurring substance use disorder, please indicate any changes in the frequency or amount of use:  Stage of change for addressing substance use diagnoses: No substance use/Not applicable    ASSESSMENT:  Марина Antunez presents with a Euthymic/ normal mood.     her affect is Normal range and intensity, which is congruent, with her mood and the content of the session. The client has made progress on their goals.    Марина Antunez presents with a none risk of suicide, none risk of self-harm, and none risk of harm to others.    For any risk assessment that surpasses a \"low\" rating, a safety plan must be developed.    A safety plan was indicated: no  If yes, describe in detail N/A    PLAN: Between sessions, Марина Antunez will explore looking at smaller tasks first. At the next session, the therapist will use Client-centered Therapy to address self care.    Behavioral Health Treatment Plan and Discharge Planning: Марина Antunez is aware of and agrees to continue to work on their treatment plan. They have identified and are working toward their discharge goals. yes    Depression Follow-up Plan Completed: Yes    Visit start and stop times:    01/28/25  Start Time: 1000  Stop Time: 1045  Total Visit Time: 45 " minutes

## 2025-02-03 DIAGNOSIS — F31.62 BIPOLAR DISORDER, CURRENT EPISODE MIXED, MODERATE (HCC): ICD-10-CM

## 2025-02-03 NOTE — TELEPHONE ENCOUNTER
Reason for call:   [x] Refill   [] Prior Auth  [] Other:     Office:   [] PCP/Provider -   [x] Specialty/Provider - psych/Mandy chen    Medication:     ARIPiprazole (ABILIFY) 10 mg tablet       Dose/Frequency:     TAKE 1 TABLET BY MOUTH EVERY DAY     Quantity: 30    Pharm[facility]: [id number] - [place] [phone]     Does the patient have enough for 3 days?   [] Yes   [x] No - Send as HP to POD

## 2025-02-04 RX ORDER — ARIPIPRAZOLE 10 MG/1
10 TABLET ORAL DAILY
Qty: 30 TABLET | Refills: 0 | Status: SHIPPED | OUTPATIENT
Start: 2025-02-04

## 2025-02-17 DIAGNOSIS — F41.1 GAD (GENERALIZED ANXIETY DISORDER): ICD-10-CM

## 2025-02-17 DIAGNOSIS — F90.1 ADHD, PREDOMINANTLY HYPERACTIVE TYPE: ICD-10-CM

## 2025-02-17 NOTE — TELEPHONE ENCOUNTER
01/27/2025 01/27/2025 HYDROcodone POLISTIREX-CHLORPHENIRA (Suspension, Extended Release) 120.0 12 8 MG/5 ML-10 MG/5 ML 20.0 [facility] Commercial Insurance 0 / 0 [place]   1 [id number] 01/25/2025 01/21/2025 Amphetamine Salt Combo (Tablet) 60.0 30 15 MG NA [employer] [employer] [facility] Commercial Insurance 0 / 0 [place]   1 [id number] 01/23/2025 01/21/2025 LORazepam (Tablet) 90.0 30 0.5 MG NA CHASE GREG-MADSEN [employer] [facility] Commercial Insurance 0 / 0 [place]   2 [id number] 01/09/2025 01/09/2025 oxyCODONE HYDROCHLORIDE 5 MG ORAL TABLET/ACETAMINOPHEN 325 MG (Tablet) 20.0 6 325 MG-5 MG 25.0 [email or web address] Commercial Insurance 0 / 0 [place]   1 [id number] 12/27/2024 12/24/2024 Amphetamine Salt Combo (Tablet) 60.0 30 15 MG NA [employer] [facility] Commercial Insurance 0 / 0 [place]   1 [id number] 12/26/2024 12/24/2024 LORazepam (Tablet) 90.0 30 0.5 MG NA [employer] [facility] Commercial Insurance 0 / 0 [place]   1 [id number] 12/19/2024 12/19/2024 Butalbital-acetaminophen-caffeine (Capsule) 50.0 8 300 MG-50 MG-40 MG NA [email or web address] Commercial Insurance 0 / 3 [place]   1 [id number] 12/09/2024 12/09/2024 HYDROcodone POLISTIREX-CHLORPHENIRA (Suspension, Extended Release) 115.0 11 8 MG/5 ML-10 MG/5 ML 20.91 [facility] Commercial Insurance 0 / 0 [place]   1 [id number] 11/29/2024 11/26/2024 Amphetamine Salt Combo (Tablet) 60.0 30 15 MG NA [employer] [facility] Commercial Insurance 0 / 0 [place]   1 [id number] 11/27/2024 11/26/2024 LORazepam (Tablet)

## 2025-02-17 NOTE — TELEPHONE ENCOUNTER
Reason for call:   [x] Refill   [] Prior Auth  [] Other:     Office:   [] PCP/Provider -   [x] Specialty/Provider - Jakob catalan    Medication:   Adderall 15 mg, 1 bid, 60  Lorazepam 0.5 mg, 1 tid prn, 90    Pharmacy:   [facility] [address], [place]    Does the patient have enough for 3 days?   [x] Yes   [] No - Send as HP to POD

## 2025-02-18 RX ORDER — DEXTROAMPHETAMINE SACCHARATE, AMPHETAMINE ASPARTATE, DEXTROAMPHETAMINE SULFATE AND AMPHETAMINE SULFATE 3.75; 3.75; 3.75; 3.75 MG/1; MG/1; MG/1; MG/1
15 TABLET ORAL 2 TIMES DAILY
Qty: 60 TABLET | Refills: 0 | Status: SHIPPED | OUTPATIENT
Start: 2025-02-18

## 2025-02-18 RX ORDER — LORAZEPAM 0.5 MG/1
0.5 TABLET ORAL EVERY 8 HOURS PRN
Qty: 90 TABLET | Refills: 0 | Status: SHIPPED | OUTPATIENT
Start: 2025-02-18

## 2025-03-03 DIAGNOSIS — F31.62 BIPOLAR DISORDER, CURRENT EPISODE MIXED, MODERATE (HCC): ICD-10-CM

## 2025-03-03 NOTE — TELEPHONE ENCOUNTER
ARIPiprazole (ABILIFY) 10 mg tablet -: Take 1 tablet (10 mg total) by mouth daily    Pt has enough for 3 days     [id number] - [place] - [address] [place] [address]    Authorizing Provider:  Mandy Huang MD- psych

## 2025-03-04 RX ORDER — ARIPIPRAZOLE 10 MG/1
10 TABLET ORAL DAILY
Qty: 30 TABLET | Refills: 5 | Status: SHIPPED | OUTPATIENT
Start: 2025-03-04

## 2025-03-05 ENCOUNTER — TELEPHONE (OUTPATIENT)
Dept: PSYCHIATRY | Facility: CLINIC | Age: 45
End: 2025-03-05

## 2025-03-05 ENCOUNTER — TELEMEDICINE (OUTPATIENT)
Dept: PSYCHIATRY | Facility: CLINIC | Age: 45
End: 2025-03-05
Payer: COMMERCIAL

## 2025-03-05 DIAGNOSIS — F90.1 ADHD, PREDOMINANTLY HYPERACTIVE TYPE: ICD-10-CM

## 2025-03-05 DIAGNOSIS — F41.1 GAD (GENERALIZED ANXIETY DISORDER): ICD-10-CM

## 2025-03-05 DIAGNOSIS — F31.32 BIPOLAR AFFECTIVE DISORDER, CURRENTLY DEPRESSED, MODERATE (HCC): Primary | ICD-10-CM

## 2025-03-05 PROBLEM — J01.10 ACUTE NON-RECURRENT FRONTAL SINUSITIS: Status: ACTIVE | Noted: 2024-12-09

## 2025-03-05 PROBLEM — J01.00 ACUTE NON-RECURRENT MAXILLARY SINUSITIS: Status: ACTIVE | Noted: 2025-01-27

## 2025-03-05 PROBLEM — J45.901 MODERATE ASTHMA WITH ACUTE EXACERBATION: Status: ACTIVE | Noted: 2024-12-09

## 2025-03-05 PROCEDURE — 99214 OFFICE O/P EST MOD 30 MIN: CPT | Performed by: PSYCHIATRY & NEUROLOGY

## 2025-03-05 RX ORDER — DEXTROAMPHETAMINE SACCHARATE, AMPHETAMINE ASPARTATE, DEXTROAMPHETAMINE SULFATE AND AMPHETAMINE SULFATE 5; 5; 5; 5 MG/1; MG/1; MG/1; MG/1
20 TABLET ORAL
Qty: 60 TABLET | Refills: 0 | Status: SHIPPED | OUTPATIENT
Start: 2025-03-16

## 2025-03-05 RX ORDER — LEVONORGESTREL 52 MG/1
INTRAUTERINE DEVICE INTRAUTERINE
COMMUNITY

## 2025-03-05 RX ORDER — OXYCODONE AND ACETAMINOPHEN 5; 325 MG/1; MG/1
TABLET ORAL
COMMUNITY
Start: 2025-01-09

## 2025-03-05 NOTE — ASSESSMENT & PLAN NOTE
Orders:    amphetamine-dextroamphetamine (ADDERALL, 20MG,) 20 mg tablet; Take 1 tablet (20 mg total) by mouth 2 (two) times a day Max Daily Amount: 40 mg Do not start before March 16, 2025.

## 2025-03-05 NOTE — PSYCH
Virtual Regular Visit    Verification of patient location:    Patient is located at Home in the following state in which I hold an active license [place]      Assessment/Plan:    Problem List Items Addressed This Visit          Behavioral Health    ADHD, predominantly hyperactive type    Bipolar affective disorder, currently depressed, moderate (HCC) - Primary    AYALA (generalized anxiety disorder)                    Reason for visit is   No chief complaint on file.         Encounter provider Mandy Huang MD      Recent Visits  No visits were found meeting these conditions.  Showing recent visits within past 7 days and meeting all other requirements  Today's Visits  Date Type Provider Dept   03/05/25 Telemedicine Mandy Huang MD [facility]   Showing today's visits and meeting all other requirements  Future Appointments  No visits were found meeting these conditions.  Showing future appointments within next 150 days and meeting all other requirements       The patient was identified by name and date of birth. Lorna Antunez was informed that this is a telemedicine visit and that the visit is being conducted throughthe Epic Embedded platform. She agrees to proceed..  My office door was closed. No one else was in the room.  She acknowledged consent and understanding of privacy and security of the video platform. The patient has agreed to participate and understands they can discontinue the visit at any time.    Patient is aware this is a billable service.     Subjective  Lorna Antunez is a 45 y.o. female  Bipolar do and ADHD  . Since last seen her  mood has been stable and sleep has improved.   Continues to follow up with pain management.  Currently on Abilify, Lorazepam and Adderall IR 15 mg po bid after last dose adjustment.  Symptoms improved and feeling overall better than she has in a long time.  No significant side effects reported.    Patient stated that she has  been anxious and instead of using Lorazepam only prn she has been using it daily.   She reported multiple stressors related to her parents health.  She is also scheduled to to start individual counseling .      Since last seen she stated she decided to increase her dose of Adderall on her own to 22.5 mg po bid because she had been busy with her daughter's dance competitions. We discussed that situational stressors may not necessarily require a dose adjustment and that the faster the dose of the stimulant os titrated the faster it becomes ineffective. Reminded about control substance contract and encouraged to avoid self dosing. Patient was receptive and understanding. Has 34 tablets left and will need refill in 11 days, this time will increase dose to 20 mg po bid.   Will schedule follow up in 3 months or sooner if needed.          HPI     Past Medical History:   Diagnosis Date    Hip dysplasia     last assessed: 9/15/15    Insulin controlled gestational diabetes mellitus in third trimester     last assessed: 9/23/15       Past Surgical History:   Procedure Laterality Date    CARPAL TUNNEL RELEASE Bilateral     FL GUIDED NEEDLE PLAC BX/ASP/INJ  9/10/2018    FL GUIDED NEEDLE PLAC BX/ASP/INJ  12/5/2018    FL GUIDED NEEDLE PLAC BX/ASP/INJ  12/27/2019    LAPAROSCOPIC CHOLECYSTECTOMY      LAILA-EN-Y PROCEDURE      TOOTH EXTRACTION      TOTAL HIP ARTHROPLASTY Left     TOTAL HIP ARTHROPLASTY Right     revision March 2023    TRIGGER FINGER RELEASE Right        Current Outpatient Medications   Medication Sig Dispense Refill    oxyCODONE-acetaminophen (PERCOCET) 5-325 mg per tablet take 1 tablet by mouth every 8 hours if needed for SEVERE PAIN ( ...  (REFER TO PRESCRIPTION NOTES).      acetaminophen (TYLENOL) 325 mg tablet Take by mouth      albuterol (ACCUNEB) 0.63 MG/3ML nebulizer solution Inhale 0.63 mg Three times a day      albuterol (PROVENTIL HFA,VENTOLIN HFA) 90 mcg/act inhaler Inhale 2 puffs every 6 (six) hours       amLODIPine (NORVASC) 5 mg tablet Take 5 mg by mouth daily      amphetamine-dextroamphetamine (ADDERALL, 15MG,) 15 MG tablet Take 1 tablet (15 mg total) by mouth 2 (two) times a day Max Daily Amount: 30 mg 60 tablet 0    ARIPiprazole (ABILIFY) 10 mg tablet Take 1 tablet (10 mg total) by mouth daily 30 tablet 5    B Complex-C (SURBEX-T PO) Take 1 tablet by mouth every morning      benzonatate (TESSALON) 200 MG capsule       Butalbital-APAP-Caffeine -40 MG CAPS Take 1 capsule by mouth Three times a day      cetirizine (ZyrTEC) 10 mg tablet Take 10 mg by mouth daily      Cholecalciferol (VITAMIN D3) 5000 units CHEW Chew 5,000 Units      diphenhydrAMINE (BENADRYL) 50 MG tablet Take 50 mg by mouth every 6 (six) hours      Dulera 200-5 MCG/ACT inhaler       EPINEPHrine (EPIPEN) 0.3 mg/0.3 mL SOAJ Inject 0.3 mg into the shoulder, thigh, or buttocks      gabapentin (NEURONTIN) 300 mg capsule TAKE 1 CAPSULE BY MOUTH EVERY 4 HOURS      ketoconazole (NIZORAL) 2 % cream APPLY TOPICALLY 2 TIMES A DAY AS NEEDED FOR RASH.      Levonorgestrel (Mirena, 52 MG,) 20 MCG/DAY IUD       lidocaine (LIDODERM) 5 % APPLY 1 TO 3 PATCHES TO AFFECTED AREA FOR UP TO 12 HOURS DAILY THEN REMOVE FOR 12 HOURS.      lisinopril (ZESTRIL) 40 mg tablet Take 40 mg by mouth      LORazepam (ATIVAN) 0.5 mg tablet Take 1 tablet (0.5 mg total) by mouth every 8 (eight) hours as needed for anxiety 90 tablet 0    melatonin 3 mg Take 3 mg by mouth      Mometasone Furo-Formoterol Fum (DULERA) 100-5 MCG/ACT AERO Inhale      omeprazole (PriLOSEC) 20 mg delayed release capsule Take 20 mg by mouth daily      omeprazole (PriLOSEC) 40 MG capsule TAKE ONE CAPSULE BY MOUTH IN THE MORNING      rosuvastatin (CRESTOR) 10 MG tablet take 1 tablet by mouth every day at night      tiZANidine (ZANAFLEX) 4 mg tablet take 1 tablet by mouth twice a day if needed for pain or SPASM(S)       No current facility-administered medications for this visit.        Allergies   Allergen  Reactions    Other Hives, Other (See Comments) and Swelling     Will trigger asthma    Montelukast Anxiety     anxiety  anxiety       Review of Systems     Mood Anxiety and Emotional Lability   Behavior Impulsive Behavior   Thought Content Disturbing Thoughts, Feelings   General Emotional Problems, Sleep Disturbances, and Decreased Functioning   Personality Change in Personality   Other Psych Symptoms Normal   Constitutional Negative   ENT Negative   Cardiovascular Negative   Respiratory Negative   Gastrointestinal Negative   Genitourinary Negative   Musculoskeletal Negative   Integumentary Negative   Neurological Negative   Endocrine Normal    Other Symptoms Normal        Laboratory Results: Recent Labs (last 6 months):   No visits with results within 6 Month(s) from this visit.   Latest known visit with results is:   Appointment on 06/03/2024   Component Date Value    VAUGHAN SYNDROME DNA ELA* 06/03/2024 Not Detected     HEREDITARY BREAST & OVAR* 06/03/2024 Not Detected     FAMILIAL HYPERCHOLESTERO* 06/03/2024 Not Detected        Substance Abuse History:  Social History     Substance and Sexual Activity   Drug Use No       Family Psychiatric History:   Family History   Problem Relation Age of Onset    Aortic stenosis Mother     Diabetes Mother     Hypertension Mother     Hyperlipidemia Mother     Diabetes Father     Hypertension Father     Hyperlipidemia Father     Heart murmur Brother     Hypertension Brother        The following portions of the patient's history were reviewed and updated as appropriate: allergies, current medications, past family history, past medical history, past social history, past surgical history, and problem list.    Social History     Socioeconomic History    Marital status: /Civil Union     Spouse name: Not on file    Number of children: 2    Years of education: some college    Highest education level: Not on file   Occupational History    Occupation: Unemployed   Tobacco Use     Smoking status: Never    Smokeless tobacco: Never   Substance and Sexual Activity    Alcohol use: No    Drug use: No    Sexual activity: Yes     Partners: Male     Birth control/protection: Male Sterilization   Other Topics Concern    Not on file   Social History Narrative    Not on file     Social Drivers of Health     Financial Resource Strain: High Risk (11/6/2024)    Received from [facility]    Overall Financial Resource Strain (CARDIA)     Difficulty of Paying Living Expenses: Hard   Food Insecurity: Food Insecurity Present (11/6/2024)    Received from [facility]    Hunger Vital Sign     Worried About Running Out of Food in the Last Year: Sometimes true     Ran Out of Food in the Last Year: Sometimes true   Transportation Needs: No Transportation Needs (11/6/2024)    Received from [facility]    PRAPARE - Transportation     Lack of Transportation (Medical): No     Lack of Transportation (Non-Medical): No   Physical Activity: Not on file   Stress: Stress Concern Present (11/6/2024)    Received from [facility]    [employer] [place] [employer] - Occupational Stress Questionnaire     Feeling of Stress : Rather much   Social Connections: Feeling Somewhat Isolated (11/6/2024)    Received from [facility]    OASIS : Social Isolation     How often do you feel lonely or isolated from those around you?: Sometimes   Intimate Partner Violence: Not At Risk (11/6/2024)    Received from [facility], [facility]    Humiliation, Afraid, Rape, and Kick questionnaire     Fear of Current or Ex-Partner: No     Emotionally Abused: No     Physically Abused: No     Sexually Abused: No   Housing Stability: High Risk (11/6/2024)    Received from [facility]    Housing Stability Vital Sign     Unable to Pay for Housing in the Last Year: Yes     Number of Times Moved in the Last Year: 0      Homeless in the Last Year: No     Social History     Social History Narrative    Not on file       Objective:       Mental status:  Appearance calm and cooperative , adequate hygiene and grooming, and good eye contact    Mood dysphoric   Affect affect was constricted   Speech a normal rate and fluent   Thought Processes coherent/organized and normal thought processes   Hallucinations no hallucinations present    Thought Content no delusions   Abnormal Thoughts no suicidal thoughts  and no homicidal thoughts    Orientation  oriented to person and place and time   Remote Memory short term memory intact and long term memory intact   Attention Span concentration impaired   Intellect Appears to be of Average Intelligence   Insight Limited insight   Judgement judgment was limited   Muscle Strength N/a   Language no difficulty naming common objects and no difficulty repeating a phrase    Fund of Knowledge displays adequate knowledge of current events, adequate fund of knowledge regarding past history, and adequate fund of knowledge regarding vocabulary                Assessment/Plan:       Diagnoses and all orders for this visit:    Bipolar affective disorder, currently depressed, moderate (HCC)    ADHD, predominantly hyperactive type    AYALA (generalized anxiety disorder)    Other orders  -     oxyCODONE-acetaminophen (PERCOCET) 5-325 mg per tablet; take 1 tablet by mouth every 8 hours if needed for SEVERE PAIN ( ...  (REFER TO PRESCRIPTION NOTES).  -     Levonorgestrel (Mirena, 52 MG,) 20 MCG/DAY IUD            Assessment & Plan  Bipolar affective disorder, currently depressed, moderate (HCC)         ADHD, predominantly hyperactive type    Orders:    amphetamine-dextroamphetamine (ADDERALL, 20MG,) 20 mg tablet; Take 1 tablet (20 mg total) by mouth 2 (two) times a day Max Daily Amount: 40 mg Do not start before March 16, 2025.    AYALA (generalized anxiety disorder)              Treatment Recommendations- Risks Benefits       Immediate Medical/Psychiatric/Psychotherapy Treatments and Any Precautions: continue current treatment     Risks, Benefits And Possible Side Effects Of Medications:  {PSYCH RISK, BENEFITS AND POSSIBLE SIDE EFFECTS (Optional):96606    Controlled Medication Discussion: Discussed with patient Black Box warning on concurrent use of benzodiazepines and opioid medications including sedation, respiratory depression, coma and death. Patient understands the risk of treatment with benzodiazepines in addition to opioids and wants to continue taking those medications. , Discussed with patient the risks of sedation, respiratory depression, impairment of ability to drive and potential for abuse and addiction related to treatment with benzodiazepine medications. The patient understands risk of treatment with benzodiazepine medications, agrees to not drive if feels impaired and agrees to take medications as prescribed., and The patient has been filling controlled prescriptions on time as prescribed to [place] Prescription Drug Monitoring program.      Psychotherapy Provided: No                              Visit Time    Visit Start Time: 10:30  Visit Stop Time: 11:00  Total Visit Duration:  30 minutes

## 2025-03-18 DIAGNOSIS — F41.1 GAD (GENERALIZED ANXIETY DISORDER): ICD-10-CM

## 2025-03-18 RX ORDER — LORAZEPAM 0.5 MG/1
0.5 TABLET ORAL EVERY 8 HOURS PRN
Qty: 90 TABLET | Refills: 0 | Status: SHIPPED | OUTPATIENT
Start: 2025-03-18

## 2025-03-18 NOTE — TELEPHONE ENCOUNTER
Reason for call:   [x] Refill   [] Prior Auth  [] Other:     Office:   [] PCP/Provider -   [x] Specialty/Provider - [facility]     Medication:     LORazepam (ATIVAN) 0.5 mg tab       Dose/Frequency:     0.5 mg, Every 8 hours PRN       Quantity: 90    Pharmacy: [id number]    Local Pharmacy   Does the patient have enough for 3 days?   [x] Yes   [] No - Send as HP to POD    Mail Away Pharmacy   Does the patient have enough for 10 days?   [] Yes   [] No - Send as HP to POD

## 2025-03-18 NOTE — TELEPHONE ENCOUNTER
Refill cannot be delegated    1 [id number] 03/16/2025 03/05/2025 Amphetamine Salt Combo (Tablet) 60.0 30 20 MG NA CHASE GREG-MADSEN [employer] [place] 0 / 0 [place]   1 [id number] 02/24/2025 02/24/2025 HYDROcodone POLISTIREX-CHLORPHENIRA (Suspension, Extended Release) 120.0 12 8 MG/5 ML-10 MG/5 ML 20.0 [facility] [employer] Commercial Insurance 0 / 0 [place]   1 [id number] 02/23/2025 02/18/2025 Amphetamine Salt Combo (Tablet) 60.0 30 15 MG NA CHASE GREG-MADSEN [employer] [facility] Commercial Insurance 0 / 0 [place]   1 [id number] 02/20/2025 02/18/2025 LORazepam (Tablet) 90.0 30 0.5 MG NA CHASE GREG-MADSEN [employer] Commercial Insurance 0 / 0 [place]   1 [id number] 01/27/2025 01/27/2025 HYDROcodone POLISTIREX-CHLORPHENIRA (Suspension, Extended Release) 120.0 12 8 MG/5 ML-10 MG/5 ML 20.0 [employer] Commercial Insurance 0 / 0 [place]   1 [id number] 01/25/2025 01/21/2025 Amphetamine Salt Combo (Tablet) 60.0 30 15 MG NA CHASE GREG-MADSEN [employer] [facility] Commercial Insurance 0 / 0 [place]   1 [id number] 01/23/2025 01/21/2025 LORazepam (Tablet) 90.0 30 0.5 MG NA CHASE GREG-MADSEN [employer] [facility] Commercial Insurance 0 / 0 [place]   2 [id number] 01/09/2025 01/09/2025 oxyCODONE HYDROCHLORIDE 5 MG ORAL TABLET/ACETAMINOPHEN 325 MG (Tablet) 20.0 6 325 MG-5 MG 25.0 [facility] Commercial Insurance 0 / 0 [place]   1 [id number] 12/27/2024 12/24/2024 Amphetamine Salt Combo (Tablet) 60.0 30 15 MG NA CHASE GREG-MADSEN [employer] [facility] Commercial Insurance 0 / 0 [place]   1 [id number] 12/26/2024 12/24/2024 LORazepam (Tablet) 90.0 30 0.5 MG NA CHASE MALIN [facility] Commercial [place] 0 / 0 [place]

## 2025-04-04 PROBLEM — J01.00 ACUTE NON-RECURRENT MAXILLARY SINUSITIS: Status: RESOLVED | Noted: 2025-01-27 | Resolved: 2025-04-04

## 2025-04-04 PROBLEM — J01.10 ACUTE NON-RECURRENT FRONTAL SINUSITIS: Status: RESOLVED | Noted: 2024-12-09 | Resolved: 2025-04-04

## 2025-04-11 DIAGNOSIS — F90.1 ADHD, PREDOMINANTLY HYPERACTIVE TYPE: ICD-10-CM

## 2025-04-11 DIAGNOSIS — F41.1 GAD (GENERALIZED ANXIETY DISORDER): ICD-10-CM

## 2025-04-11 RX ORDER — DEXTROAMPHETAMINE SACCHARATE, AMPHETAMINE ASPARTATE, DEXTROAMPHETAMINE SULFATE AND AMPHETAMINE SULFATE 5; 5; 5; 5 MG/1; MG/1; MG/1; MG/1
20 TABLET ORAL
Qty: 60 TABLET | Refills: 0 | Status: SHIPPED | OUTPATIENT
Start: 2025-04-11

## 2025-04-11 RX ORDER — LORAZEPAM 0.5 MG/1
0.5 TABLET ORAL EVERY 8 HOURS PRN
Qty: 90 TABLET | Refills: 0 | Status: SHIPPED | OUTPATIENT
Start: 2025-04-11

## 2025-04-11 NOTE — TELEPHONE ENCOUNTER
03/20/2025 03/18/2025 LORazepam (Tablet) 90.0 30 0.5 MG NA CHASE GREG-MADSEN [employer] [facility] Commercial Insurance [place] / 0 [place]   1 [id number] 03/16/2025 03/05/2025 Amphetamine Salt Combo (Tablet) 60.0 30 20 MG NA CHASE GREG-CALE [employer] [facility] Commercial [place] / 0 [place]   1 [id number] 02/24/2025 02/24/2025 HYDROcodone POLISTIREX-CHLORPHENIRA (Suspension, Extended Release) 120.0 12 8 MG/5 ML-10 MG/5 ML 20.0 ARIEL RICE [employer] [facility] Commercial [place] 0 / 0 [place]   1 [id number] 02/23/2025 02/18/2025 Amphetamine Salt Combo (Tablet) 60.0 30 15 MG NA CHASE GREG-MADSEN [employer] [facility] Commercial Insurance 0 / 0 [place]   1 [id number] 02/20/2025 02/18/2025 LORazepam (Tablet) 90.0 30 0.5 MG NA CHASE GREG-MADSEN [employer] [facility] Commercial Insurance 0 / 0 [place]   1 [id number] 01/27/2025 01/27/2025 HYDROcodone POLISTIREX-CHLORPHENIRA (Suspension, Extended Release) 120.0 12 8 MG/5 ML-10 MG/5 ML 20.0 [facility] [employer] [facility] Commercial Insurance 0 / 0 [place]   1 [id number] 01/25/2025 01/21/2025 Amphetamine Salt Combo (Tablet) 60.0 30 15 MG NA CHASE GARZA-CALE [employer] [facility] Commercial Insurance 0 / 0 [place]   1 [id number] 01/23/2025 01/21/2025 LORazepam (Tablet) 90.0 30 0.5 MG NA CHASE GREG-MADSEN [employer] [facility] [facility] Commercial Insurance 0 / 0 [place]   2 [id number] 01/09/2025 01/09/2025 oxyCODONE HYDROCH

## 2025-04-11 NOTE — TELEPHONE ENCOUNTER
Reason for call:   [x] Refill   [] Prior Auth  [] Other:     Office:   [] PCP/Provider -   [x] Specialty/Provider -  Mandy Huang MD     Medication: LORazepam 5 mg / 1 tab daily / 90 tabs                      ADDERALL, 20MG,) / 1 tab bid / 60 tabs        [facility]: [id number] [place] - [address] [place] [address] Pharmacy   Does the patient have enough for 3 days?   [x] Yes   [] No - Send as HP to POD    Mail Away Pharmacy   Does the patient have enough for 10 days?   [] Yes   [] No - Send as HP to POD

## 2025-04-21 ENCOUNTER — TELEPHONE (OUTPATIENT)
Age: 45
End: 2025-04-21

## 2025-04-21 NOTE — TELEPHONE ENCOUNTER
Patient is calling regarding cancelling an appointment.    Date/Time: 4.22.25 @ 10:00am    Reason: None given    Patient was rescheduled: YES [x] NO []  If yes, when was Patient reschedule for: 4.28.25 @ 3:00pm    Patient requesting call back to reschedule: YES [] NO [x]    -Insurance confirmed  -Copay reviewed  -Writer notified the provider via secure chat and the clerical department was updated.

## 2025-05-12 DIAGNOSIS — F41.1 GAD (GENERALIZED ANXIETY DISORDER): ICD-10-CM

## 2025-05-12 DIAGNOSIS — F90.1 ADHD, PREDOMINANTLY HYPERACTIVE TYPE: ICD-10-CM

## 2025-05-12 RX ORDER — LORAZEPAM 0.5 MG/1
0.5 TABLET ORAL EVERY 8 HOURS PRN
Qty: 90 TABLET | Refills: 0 | Status: SHIPPED | OUTPATIENT
Start: 2025-05-12

## 2025-05-12 RX ORDER — DEXTROAMPHETAMINE SACCHARATE, AMPHETAMINE ASPARTATE, DEXTROAMPHETAMINE SULFATE AND AMPHETAMINE SULFATE 5; 5; 5; 5 MG/1; MG/1; MG/1; MG/1
20 TABLET ORAL
Qty: 60 TABLET | Refills: 0 | Status: SHIPPED | OUTPATIENT
Start: 2025-05-12

## 2025-05-12 NOTE — TELEPHONE ENCOUNTER
Reason for call:   [x] Refill   [] Prior Auth  [] Other:     Office:   [] PCP/Provider -   [x] Specialty/Provider - Psych    amphetamine-dextroamphetamine (ADDERALL, 20MG,) 20 mg tablet/ Take 1 tablet (20 mg total) by mouth 2 (two) times a day / Qty 60    LORazepam (ATIVAN) 0.5 mg tablet/ Take 1 tablet (0.5 mg total) by mouth every 8 (eight) hours as needed for anxiety / Qty 90    Pharmacy: [id number] - [place] - [address] [place] [address] Pharmacy   Does the patient have enough for 3 days?   [x] Yes   [] No - Send as HP to POD    Mail Away Pharmacy   Does the patient have enough for 10 days?   [] Yes   [] No - Send as HP to POD

## 2025-05-12 NOTE — TELEPHONE ENCOUNTER
04/17/2025 04/11/2025 LORazepam (Tablet) 90.0 30 0.5 MG NA [email or web address] [facility] Commercial [place] 0 / 0 [place]   1 [id number] ** 04/16/2025 04/16/2025 CODEINE PHOSPHATE/guaiFENesin (Solution) 236.0 11 10 MG/5 ML-100 MG/5 ML 6.44 [employer] [facility] Commercial [place] 0 / 0 [place]   1 [id number] ** 04/15/2025 04/11/2025 Amphetamine Salt Combo (Tablet) 60.0 30 20 MG NA [employer] [facility] Commercial [place] / 0 [place]   1 [id number] ** 04/15/2025 12/19/2024 Butalbital-acetaminophen-caffeine (Capsule) 50.0 8 300 MG-50 MG-40 MG NA [employer] [facility] Commercial Insurance 1 / 3 [place]   1 [id number] ** 03/20/2025 03/18/2025 LORazepam (Tablet) 90.0 30 0.5 MG NA [employer] [facility] Commercial Insurance 0 / 0 [place]   1 [id number] ** 03/16/2025 03/05/2025 Amphetamine Salt Combo (Tablet) 60.0 30 20 MG NA [employer] [facility] Commercial Insurance 0 / 0 [place]   1 [id number] ** 02/24/2025 02/24/2025 HYDROcodone POLISTIREX-CHLORPHENIRA (Suspension, Extended Release) 120.0 12 8 MG/5 ML-10 MG/5 ML 20.0 [employer] [facility] Commercial Insurance 0 / 0 [place]   1 [id number] ** 02/23/2025 02/18/2025 Amphetamine Salt Combo (Tab

## 2025-06-04 ENCOUNTER — TELEPHONE (OUTPATIENT)
Dept: PSYCHIATRY | Facility: CLINIC | Age: 45
End: 2025-06-04

## 2025-06-04 ENCOUNTER — TELEMEDICINE (OUTPATIENT)
Dept: PSYCHIATRY | Facility: CLINIC | Age: 45
End: 2025-06-04
Payer: COMMERCIAL

## 2025-06-04 DIAGNOSIS — F41.1 GAD (GENERALIZED ANXIETY DISORDER): ICD-10-CM

## 2025-06-04 DIAGNOSIS — F90.1 ADHD, PREDOMINANTLY HYPERACTIVE TYPE: ICD-10-CM

## 2025-06-04 DIAGNOSIS — F31.32 BIPOLAR AFFECTIVE DISORDER, CURRENTLY DEPRESSED, MODERATE (HCC): Primary | ICD-10-CM

## 2025-06-04 PROCEDURE — 99214 OFFICE O/P EST MOD 30 MIN: CPT | Performed by: PSYCHIATRY & NEUROLOGY

## 2025-06-04 RX ORDER — ONDANSETRON 8 MG/1
8 TABLET, ORALLY DISINTEGRATING ORAL EVERY 8 HOURS PRN
COMMUNITY
Start: 2025-05-19

## 2025-06-04 RX ORDER — DEXTROAMPHETAMINE SACCHARATE, AMPHETAMINE ASPARTATE, DEXTROAMPHETAMINE SULFATE AND AMPHETAMINE SULFATE 5; 5; 5; 5 MG/1; MG/1; MG/1; MG/1
20 TABLET ORAL
Qty: 60 TABLET | Refills: 0 | Status: SHIPPED | OUTPATIENT
Start: 2025-06-10

## 2025-06-04 NOTE — BH TREATMENT PLAN
TREATMENT PLAN (Medication Management Only)        [facility]    Name and Date of Birth:  Lorna Antunez 45 y.o. 1980  MRN: [id number]  Date of Treatment Plan: June 4, 2025  Diagnosis/Diagnoses:    1. Bipolar affective disorder, currently depressed, moderate (HCC)    2. ADHD, predominantly hyperactive type    3. AYALA (generalized anxiety disorder)      Strengths/Personal Resources for Self-Care: taking medications as prescribed, ability to communicate needs.  Area/Areas of need (in own words): mood instability  1. Long Term Goal:   continue improvement in mood stability.  Target Date:6 months - 12/4/2025  Person/Persons responsible for completion of goal: Lorna  2.  Short Term Objective (s) - How will we reach this goal?:   A.  Provider new recommended medication/dosage changes and/or continue medication(s): continue current medications as prescribed.  B.  N/A.  C.  N/A.  Target Date:6 months - 12/4/2025  Person/Persons Responsible for Completion of Goal: Lorna  Progress Towards Goals: continuing treatment  Treatment Modality: medication management every 6 months  Review due 180 days from date of this plan: 6 months - 12/4/2025  Expected length of service: ongoing treatment unless revised  My Physician/PA/NP and I have developed this plan together and I agree to work on the goals and objectives. I understand the treatment goals that were developed for my treatment.   Electronic Signatures: on file (unless signed below)    Mandy Huang MD 06/04/25

## 2025-06-04 NOTE — TELEPHONE ENCOUNTER
Called and left message for patient to return a call to [phone] and schedule 3 month follow up with provider (Dr Robert). Please schedule upon return call. Thank you.      3 months (around 9/4/2025).

## 2025-06-04 NOTE — PSYCH
Virtual Regular Visit    Verification of patient location:    Patient is located at Home in the following state in which I hold an active license [place]      Assessment/Plan:    Problem List Items Addressed This Visit          Behavioral Health    ADHD, predominantly hyperactive type    Relevant Medications    amphetamine-dextroamphetamine (ADDERALL, 20MG,) 20 mg tablet (Start on 6/10/2025)                      Reason for visit is   No chief complaint on file.         Encounter provider Mandy Huang MD      Recent Visits  No visits were found meeting these conditions.  Showing recent visits within past 7 days and meeting all other requirements  Today's Visits  Date Type Provider Dept   06/04/25 Telemedicine Mandy Huang MD [facility]   Showing today's visits and meeting all other requirements  Future Appointments  No visits were found meeting these conditions.  Showing future appointments within next 150 days and meeting all other requirements       The patient was identified by name and date of birth. Lorna Antunez was informed that this is a telemedicine visit and that the visit is being conducted throughthe Epic Embedded platform. She agrees to proceed..  My office door was closed. No one else was in the room.  She acknowledged consent and understanding of privacy and security of the video platform. The patient has agreed to participate and understands they can discontinue the visit at any time.    Patient is aware this is a billable service.     Subjective  Lorna Antunez is a 45 y.o. female  Bipolar do and ADHD  . Since last seen her  mood has been stable and sleep has improved.   Continues to follow up with pain management.  Currently on Abilify, Lorazepam and Adderall IR 20 mg po bid after last dose adjustment.  Symptoms improved and feeling overall better than she has in a long time.  No significant side effects reported.      Since last seen she stated she  had been doing well. Feels she benefits from the last dose increase on Adderall. Mood is stable and she will like to continue current medications as prescribed.   Will schedule follow up in 3 months or sooner if needed.          HPI     Past Medical History:   Diagnosis Date    Hip dysplasia     last assessed: 9/15/15    Insulin controlled gestational diabetes mellitus in third trimester     last assessed: 9/23/15       Past Surgical History:   Procedure Laterality Date    CARPAL TUNNEL RELEASE Bilateral     FL GUIDED NEEDLE PLAC BX/ASP/INJ  9/10/2018    FL GUIDED NEEDLE PLAC BX/ASP/INJ  12/5/2018    FL GUIDED NEEDLE PLAC BX/ASP/INJ  12/27/2019    LAPAROSCOPIC CHOLECYSTECTOMY      LAILA-EN-Y PROCEDURE      TOOTH EXTRACTION      TOTAL HIP ARTHROPLASTY Left     TOTAL HIP ARTHROPLASTY Right     revision March 2023    TRIGGER FINGER RELEASE Right        Current Outpatient Medications   Medication Sig Dispense Refill    [START ON 6/10/2025] amphetamine-dextroamphetamine (ADDERALL, 20MG,) 20 mg tablet Take 1 tablet (20 mg total) by mouth 2 (two) times a day Max Daily Amount: 40 mg Do not start before Lydia 10, 2025. 60 tablet 0    ondansetron (ZOFRAN-ODT) 8 mg disintegrating tablet Apply 8 mg to cheek every 8 (eight) hours as needed      acetaminophen (TYLENOL) 325 mg tablet Take by mouth      albuterol (ACCUNEB) 0.63 MG/3ML nebulizer solution Inhale 0.63 mg Three times a day      albuterol (PROVENTIL HFA,VENTOLIN HFA) 90 mcg/act inhaler Inhale 2 puffs every 6 (six) hours      amLODIPine (NORVASC) 5 mg tablet Take 5 mg by mouth daily      ARIPiprazole (ABILIFY) 10 mg tablet Take 1 tablet (10 mg total) by mouth daily 30 tablet 5    B Complex-C (SURBEX-T PO) Take 1 tablet by mouth every morning      benzonatate (TESSALON) 200 MG capsule       Butalbital-APAP-Caffeine -40 MG CAPS Take 1 capsule by mouth Three times a day      cetirizine (ZyrTEC) 10 mg tablet Take 10 mg by mouth daily      Cholecalciferol (VITAMIN D3)  5000 units CHEW Chew 5,000 Units      diphenhydrAMINE (BENADRYL) 50 MG tablet Take 50 mg by mouth every 6 (six) hours      Dulera 200-5 MCG/ACT inhaler       EPINEPHrine (EPIPEN) 0.3 mg/0.3 mL SOAJ Inject 0.3 mg into the shoulder, thigh, or buttocks      gabapentin (NEURONTIN) 300 mg capsule TAKE 1 CAPSULE BY MOUTH EVERY 4 HOURS      ketoconazole (NIZORAL) 2 % cream APPLY TOPICALLY 2 TIMES A DAY AS NEEDED FOR RASH.      lidocaine (LIDODERM) 5 % APPLY 1 TO 3 PATCHES TO AFFECTED AREA FOR UP TO 12 HOURS DAILY THEN REMOVE FOR 12 HOURS.      lisinopril (ZESTRIL) 40 mg tablet Take 40 mg by mouth      LORazepam (ATIVAN) 0.5 mg tablet Take 1 tablet (0.5 mg total) by mouth every 8 (eight) hours as needed for anxiety 90 tablet 0    melatonin 3 mg Take 3 mg by mouth      Mometasone Furo-Formoterol Fum (DULERA) 100-5 MCG/ACT AERO Inhale      omeprazole (PriLOSEC) 20 mg delayed release capsule Take 20 mg by mouth daily      omeprazole (PriLOSEC) 40 MG capsule TAKE ONE CAPSULE BY MOUTH IN THE MORNING      rosuvastatin (CRESTOR) 10 MG tablet take 1 tablet by mouth every day at night      tiZANidine (ZANAFLEX) 4 mg tablet take 1 tablet by mouth twice a day if needed for pain or SPASM(S)       No current facility-administered medications for this visit.        Allergies   Allergen Reactions    Other Hives, Other (See Comments) and Swelling     Will trigger asthma    Montelukast Anxiety     anxiety  anxiety       Review of Systems     Mood Anxiety and Emotional Lability   Behavior Impulsive Behavior   Thought Content Disturbing Thoughts, Feelings   General Emotional Problems, Sleep Disturbances, and Decreased Functioning   Personality Change in Personality   Other Psych Symptoms Normal   Constitutional Negative   ENT Negative   Cardiovascular Negative   Respiratory Negative   Gastrointestinal Negative   Genitourinary Negative   Musculoskeletal Negative   Integumentary Negative   Neurological Negative   Endocrine Normal    Other  Symptoms Normal        Laboratory Results: Recent Labs (last 6 months):   No visits with results within 6 Month(s) from this visit.   Latest known visit with results is:   Appointment on 06/03/2024   Component Date Value    VAUGHAN SYNDROME DNA ELA* 06/03/2024 Not Detected     HEREDITARY BREAST & OVAR* 06/03/2024 Not Detected     FAMILIAL HYPERCHOLESTERO* 06/03/2024 Not Detected        Substance Abuse History:  Social History     Substance and Sexual Activity   Drug Use No       Family Psychiatric History:   Family History   Problem Relation Name Age of Onset    Aortic stenosis Mother      Diabetes Mother      Hypertension Mother      Hyperlipidemia Mother      Diabetes Father      Hypertension Father      Hyperlipidemia Father      Heart murmur Brother      Hypertension Brother         The following portions of the patient's history were reviewed and updated as appropriate: allergies, current medications, past family history, past medical history, past social history, past surgical history, and problem list.    Social History     Socioeconomic History    Marital status: /Civil Union     Spouse name: Not on file    Number of children: 2    Years of education: some college    Highest education level: Not on file   Occupational History    Occupation: Unemployed   Tobacco Use    Smoking status: Never    Smokeless tobacco: Never   Substance and Sexual Activity    Alcohol use: No    Drug use: No    Sexual activity: Yes     Partners: Male     Birth control/protection: Male Sterilization   Other Topics Concern    Not on file   Social History Narrative    Not on file     Social Drivers of Health     Financial Resource Strain: High Risk (11/6/2024)    Received from [facility]    Overall Financial Resource Strain (CARDIA)     Difficulty of Paying Living Expenses: Hard   Food Insecurity: Food Insecurity Present (11/6/2024)    Received from [facility]    Hunger Vital Sign     Within  the past 12 months, you worried that your food would run out before you got the money to buy more.: Sometimes true     Within the past 12 months, the food you bought just didn't last and you didn't have money to get more.: Sometimes true   Transportation Needs: No Transportation Needs (11/6/2024)    Received from [facility] [place]    PRAPARE - Transportation     Lack of Transportation (Medical): No     Lack of Transportation (Non-Medical): No   Physical Activity: Not on file   Stress: Stress Concern Present (11/6/2024)    Received from [facility]    [place] [employer] - Occupational Stress Questionnaire     Feeling of Stress : Rather much   Social Connections: Feeling Somewhat Isolated (11/6/2024)    Received from [facility]    OASIS : Social Isolation     How often do you feel lonely or isolated from those around you?: Sometimes   Intimate Partner Violence: Not At Risk (11/6/2024)    Received from [facility]    Humiliation, Afraid, Rape, and Kick questionnaire     Within the last year, have you been afraid of your partner or ex-partner?: No     Within the last year, have you been humiliated or emotionally abused in other ways by your partner or ex-partner?: No     Within the last year, have you been kicked, hit, slapped, or otherwise physically hurt by your partner or ex-partner?: No     Within the last year, have you been raped or forced to have any kind of sexual activity by your partner or ex-partner?: No   Housing Stability: High Risk (11/6/2024)    Received from [facility]    Housing Stability Vital Sign     In the last 12 months, was there a time when you were not able to pay the mortgage or rent on time?: Yes     In the past 12 months, how many times have you moved where you were living?: 0     At any time in the past 12 months, were you homeless or living in a shelter (including now)?: No     Social  History     Social History Narrative    Not on file       Objective:       Mental status:  Appearance calm and cooperative , adequate hygiene and grooming, and good eye contact    Mood dysphoric   Affect affect was constricted   Speech a normal rate and fluent   Thought Processes coherent/organized and normal thought processes   Hallucinations no hallucinations present    Thought Content no delusions   Abnormal Thoughts no suicidal thoughts  and no homicidal thoughts    Orientation  oriented to person and place and time   Remote Memory short term memory intact and long term memory intact   Attention Span concentration impaired   Intellect Appears to be of Average Intelligence   Insight Limited insight   Judgement judgment was limited   Muscle Strength N/a   Language no difficulty naming common objects and no difficulty repeating a phrase    Fund of Knowledge displays adequate knowledge of current events, adequate fund of knowledge regarding past history, and adequate fund of knowledge regarding vocabulary                Assessment/Plan:       Diagnoses and all orders for this visit:    ADHD, predominantly hyperactive type  -     amphetamine-dextroamphetamine (ADDERALL, 20MG,) 20 mg tablet; Take 1 tablet (20 mg total) by mouth 2 (two) times a day Max Daily Amount: 40 mg Do not start before Lydia 10, 2025.    Other orders  -     ondansetron (ZOFRAN-ODT) 8 mg disintegrating tablet; Apply 8 mg to cheek every 8 (eight) hours as needed              Assessment & Plan  ADHD, predominantly hyperactive type    Orders:    amphetamine-dextroamphetamine (ADDERALL, 20MG,) 20 mg tablet; Take 1 tablet (20 mg total) by mouth 2 (two) times a day Max Daily Amount: 40 mg Do not start before Lydia 10, 2025.         Treatment Recommendations- Risks Benefits      Immediate Medical/Psychiatric/Psychotherapy Treatments and Any Precautions: continue current treatment     Risks, Benefits And Possible Side Effects Of Medications:  {PSYCH RISK,  BENEFITS AND POSSIBLE SIDE EFFECTS (Optional):74394    Controlled Medication Discussion: Discussed with patient Black Box warning on concurrent use of benzodiazepines and opioid medications including sedation, respiratory depression, coma and death. Patient understands the risk of treatment with benzodiazepines in addition to opioids and wants to continue taking those medications. , Discussed with patient the risks of sedation, respiratory depression, impairment of ability to drive and potential for abuse and addiction related to treatment with benzodiazepine medications. The patient understands risk of treatment with benzodiazepine medications, agrees to not drive if feels impaired and agrees to take medications as prescribed., and The patient has been filling controlled prescriptions on time as prescribed to [place] Prescription Drug Monitoring program.      Psychotherapy Provided: No          The Patient is located at Home and in the following state in which I hold an active license [place].    The patient was identified by name and date of birth. Patient  was informed that this is a telemedicine visit and that the visit is being conducted through the Epic Embedded platform. She agrees to proceed..  My office door was closed. No one else was in the room.  She acknowledged consent and understanding of privacy and security of the video platform. The patient has agreed to participate and understands they can discontinue the visit at any time.    I have spent a total time of 30 minutes in caring for this patient on the day of the visit/encounter including Prognosis, Risks and benefits of tx options, Instructions for management, Patient and family education, Importance of tx compliance, Risk factor reductions, Impressions, Documenting in the medical record, Reviewing/placing orders in the medical record (including tests, medications, and/or procedures), and Obtaining or reviewing history  , not including the time  spent for establishing the audio/video connection.                      Visit Time    Visit Start Time: 11:00  Visit Stop Time: 11:30  Total Visit Duration: 30 minutes

## 2025-06-04 NOTE — ASSESSMENT & PLAN NOTE
Orders:    amphetamine-dextroamphetamine (ADDERALL, 20MG,) 20 mg tablet; Take 1 tablet (20 mg total) by mouth 2 (two) times a day Max Daily Amount: 40 mg Do not start before Lydia 10, 2025.

## 2025-06-13 DIAGNOSIS — F41.1 GAD (GENERALIZED ANXIETY DISORDER): ICD-10-CM

## 2025-06-13 RX ORDER — LORAZEPAM 0.5 MG/1
0.5 TABLET ORAL EVERY 8 HOURS PRN
Qty: 90 TABLET | Refills: 0 | Status: SHIPPED | OUTPATIENT
Start: 2025-06-13

## 2025-06-13 NOTE — TELEPHONE ENCOUNTER
Reason for call:   [x] Refill   [] Prior Auth  [] Other:     Office:   [] PCP/Provider -   [x] Specialty/Provider - Mandy Huang     Medication: LORazepam (ATIVAN) 0.5 mg tablet     Dose/Frequency: Take 1 tablet (0.5 mg total) by mouth every 8 (eight) hours as needed for anxiety     Quantity: 90    Pharmacy: [facility]   Does the patient have enough for 3 days?   [x] Yes   [] No - Send as HP to POD

## 2025-06-13 NOTE — TELEPHONE ENCOUNTER
Refill cannot be delegated    1 [id number] ** 06/11/2025 06/04/2025 Amphetamine Salt Combo (Tablet) 60.0 30 20 MG NA CHASE GREG-MADSEN [employer] [facility] [place] / 0 [place]   1 [id number] ** 06/04/2025 06/02/2025 HYDROcodone POLISTIREX-CHLORPHENIRA (Suspension, Extended Release) 115.0 12 8 MG/5 ML-10 MG/5 ML 19.17 [employer] [facility] [place] / 0 [place]   1 [id number] ** 05/15/2025 05/12/2025 LORazepam (Tablet) 90.0 30 0.5 MG NA CHASE GREG-MADSEN [employer] [facility] [place] / 0 [place]   1 [id number] ** 05/13/2025 05/12/2025 Amphetamine Salt Combo (Tablet) 60.0 30 20 MG NA CHASE GREG-MADSEN [employer] [facility] [place]   1 [id number] ** 04/17/2025 04/11/2025 LORazepam (Tablet) 90.0 30 0.5 MG NA CHASE GREG-MADSEN [employer] [facility] [place]   1 [id number] ** 04/16/2025 04/16/2025 CODEINE PHOSPHATE/guaiFENesin (Solution) 236.0 11 10 MG/5 ML-100 MG/5 ML 6.44 [employer] [place] / 0 [place]   1 [id number] ** 04/15/2025 04/11/2025 Amphetamine Salt Combo (Tablet) 60.0 30 20 MG NA CHASE VaurumZ [employer] [place] / [place]   1 [id number] ** 04/15/2025 12/19/2024 Butalbital-acetaminophen-caffeine (Capsule) 50.0 8 300 MG-50 MG-40 MG NA [employer] [place] 1 / 3 [place]   1 [id number] ** 03/20/2025 03/18/2025 LORazepam (Tablet) 90.0 30 0.5 MG NA CHASE GREG-MADSEN [employer] [facility] [place]   1 [id number] ** 03/16/2025 03/05/2025 Amphetamine Salt Combo (Tablet) 60.0 30 20 MG [facility] [place] [facility] 0 / 0 [place]

## 2025-07-11 ENCOUNTER — TELEPHONE (OUTPATIENT)
Age: 45
End: 2025-07-11

## 2025-07-11 DIAGNOSIS — F90.1 ADHD, PREDOMINANTLY HYPERACTIVE TYPE: ICD-10-CM

## 2025-07-11 DIAGNOSIS — F41.1 GAD (GENERALIZED ANXIETY DISORDER): ICD-10-CM

## 2025-07-11 NOTE — TELEPHONE ENCOUNTER
Reason for call:   [x] Refill   [] Prior Auth  [] Other:     Office:   [] PCP/Provider -   [x] Specialty/Provider -  Mandy Huang    Medication: Adderall  Dose/Frequency: 20 mg BID   Quantity: 60    Medication: Lorazepam  Dose/Frequency: 0.5 mg Q 8 HRS PRN  Quantity: 90      Pharmacy: [facility] [place] [facility]   Does the patient have enough for 3 days?   [x] Yes   [] No - Send as HP to POD    [facility]   Does the patient have enough for 10 days?   [] Yes   [] No - Send as HP to POD

## 2025-07-14 RX ORDER — LORAZEPAM 0.5 MG/1
0.5 TABLET ORAL EVERY 8 HOURS PRN
Qty: 90 TABLET | Refills: 0 | Status: SHIPPED | OUTPATIENT
Start: 2025-07-14

## 2025-07-14 RX ORDER — DEXTROAMPHETAMINE SACCHARATE, AMPHETAMINE ASPARTATE, DEXTROAMPHETAMINE SULFATE AND AMPHETAMINE SULFATE 5; 5; 5; 5 MG/1; MG/1; MG/1; MG/1
20 TABLET ORAL
Qty: 60 TABLET | Refills: 0 | Status: SHIPPED | OUTPATIENT
Start: 2025-07-14

## 2025-07-14 NOTE — TELEPHONE ENCOUNTER
Refill must be reviewed and completed by the office or provider. The refill is unable to be approved or denied by the medication management team.      07/03/2025 07/03/2025 HYDROcodone BITARTRATE 5 MG ORAL TABLET/ACETAMINOPHEN 325 MG (Tablet) 60.0 20 325 MG-5 MG 15.0 ARIEL SANTOS [facility] [place] 0 / 0 [place]   1 [id number] ** 06/13/2025 06/13/2025 LORazepam (Tablet) 90.0 30 0.5 MG NA CHASE MALIN [employer] [place] / 0 [place]   1 [id number] ** 06/11/2025 06/04/2025 Amphetamine Salt Combo (Tablet) 60.0 30 20 MG NA CHASE GARZA-CALE [employer] [facility] [place] / [place]